# Patient Record
Sex: MALE | Race: WHITE | Employment: FULL TIME | URBAN - METROPOLITAN AREA
[De-identification: names, ages, dates, MRNs, and addresses within clinical notes are randomized per-mention and may not be internally consistent; named-entity substitution may affect disease eponyms.]

---

## 2018-02-06 ENCOUNTER — OFFICE VISIT (OUTPATIENT)
Dept: INTERNAL MEDICINE CLINIC | Age: 38
End: 2018-02-06

## 2018-02-06 VITALS
OXYGEN SATURATION: 100 % | DIASTOLIC BLOOD PRESSURE: 61 MMHG | RESPIRATION RATE: 18 BRPM | HEIGHT: 68 IN | BODY MASS INDEX: 26.52 KG/M2 | HEART RATE: 70 BPM | TEMPERATURE: 98.1 F | SYSTOLIC BLOOD PRESSURE: 116 MMHG | WEIGHT: 175 LBS

## 2018-02-06 DIAGNOSIS — R05.8 DRY COUGH: Primary | ICD-10-CM

## 2018-02-06 DIAGNOSIS — R09.82 POST-NASAL DRAINAGE: ICD-10-CM

## 2018-02-06 DIAGNOSIS — Z76.89 ESTABLISHING CARE WITH NEW DOCTOR, ENCOUNTER FOR: ICD-10-CM

## 2018-02-06 DIAGNOSIS — F32.A DEPRESSION, UNSPECIFIED DEPRESSION TYPE: ICD-10-CM

## 2018-02-06 RX ORDER — FLUOXETINE HYDROCHLORIDE 20 MG/1
CAPSULE ORAL
Refills: 0 | COMMUNITY
Start: 2018-01-20 | End: 2018-10-24 | Stop reason: ALTCHOICE

## 2018-02-06 NOTE — MR AVS SNAPSHOT
[facility] [address] [place] [address] 
[phone] Patient: Ozzy Franklin MRN: [id number]:[id number] Visit Information Date & Time Provider Department Dept. Phone Encounter #  
 2/6/2018  8:30 AM Bin Villagran MD [facility] Internal Medicine [phone] [id number] Follow-up Instructions Return in about 3 months (around 5/6/2018) for complete physical  and fasting blood work. Av Gomez Upcoming Health Maintenance Date Due DTaP/Tdap/Td series (1 - Tdap) 11/20/2003 Influenza Age 5 to Adult 8/1/2017 Allergies as of 2/6/2018  Review Complete On: 2/6/2018 By: Bin Villagran MD  
 No Known Allergies Current Immunizations  Never Reviewed Name Date Influenza Vaccine 9/16/2017 MMR 12/21/2017, 9/16/2017 Poliovirus vaccine 9/16/2017 Td 12/21/2017 Tdap 9/16/2017 Not reviewed this visit You Were Diagnosed With   
  
 Codes Comments Dry cough    -  Primary ICD-10-CM: X32 ICD-9-CM: 786.2 Establishing care with new doctor, encounter for     ICD-10-CM: Z76.89 
ICD-9-CM: V65.8 Vitals BP Pulse Temp Resp Height(growth percentile) 116/61 (BP 1 Location: Left arm, BP Patient Position: Sitting) 70 98.1 °F (36.7 °C) (Temporal) 18 5' 8\" (1.727 m) Weight(growth percentile) SpO2 BMI Smoking Status 175 lb (79.4 kg) 100% 26.61 kg/m2 Never Smoker Vitals History BMI and BSA Data Body Mass Index Body Surface Area  
 26.61 kg/m 2 1.95 m 2 Your Updated Medication List  
  
   
This list is accurate as of: 2/6/18  9:12 AM.  Always use your most recent med list.  
  
  
  
  
 FLUoxetine 20 mg capsule Commonly known as:  PROzac TAKE ONE CAPSULE BY MOUTH ONCE DAILY - SEE PCP AS INSTRUCTED Follow-up Instructions Return in about 3 months (around 5/6/2018) for complete physical  and fasting blood work. Av Gomez Patient Instructions Cough: Care Instructions Your Care Instructions A cough is your body's response to something that bothers your throat or airways. Many things can cause a cough. You might cough because of a cold or the flu, bronchitis, or asthma. Smoking, postnasal drip, allergies, and stomach acid that backs up into your throat also can cause coughs. A cough is a symptom, not a disease. Most coughs stop when the cause, such as a cold, goes away. You can take a few steps at home to cough less and feel better. Follow-up care is a key part of your treatment and safety. Be sure to make and go to all appointments, and call your doctor if you are having problems. It's also a good idea to know your test results and keep a list of the medicines you take. How can you care for yourself at home? · Drink lots of water and other fluids. This helps thin the mucus and soothes a dry or sore throat. Honey or lemon juice in hot water or tea may ease a dry cough. · Take cough medicine as directed by your doctor. · Prop up your head on pillows to help you breathe and ease a dry cough. · Try cough drops to soothe a dry or sore throat. Cough drops don't stop a cough. Medicine-flavored cough drops are no better than candy-flavored drops or hard candy. · Do not smoke. Avoid secondhand smoke. If you need help quitting, talk to your doctor about stop-smoking programs and medicines. These can increase your chances of quitting for good. When should you call for help? Call 911 anytime you think you may need emergency care. For example, call if: 
? · You have severe trouble breathing. ?Call your doctor now or seek immediate medical care if: 
? · You cough up blood. ? · You have new or worse trouble breathing. ? · You have a new or higher fever. ? · You have a new rash. ? Watch closely for changes in your health, and be sure to contact your doctor if: 
? · You cough more deeply or more often, especially if you notice more mucus or a change in the color of your mucus. ? · You have new symptoms, such as a sore throat, an earache, or sinus pain. ? · You do not get better as expected. Where can you learn more? Go to http://darrick-kecia.info/. Enter D279 in the search box to learn more about \"Cough: Care Instructions. \" Current as of: May 12, 2017 Content Version: 11.4 © 8959-6616 EchoFirst. Care instructions adapted under license by Link[employer] (which disclaims liability or warranty for this information). If you have questions about a medical condition or this instruction, always ask your healthcare professional. [address] any warranty or liability for your use of this information. Introducing [facility] & HEALTH SERVICES! [employer] introduces [employer] patient portal. Now you can access parts of your medical record, email your doctor's office, and request medication refills online. 1. In your internet browser, go to [email or web address] The Influence/iBiz Software 2. Click on the First Time User? Click Here link in the Sign In box. You will see the New Member Sign Up page. 3. Enter your [employer] Access Code exactly as it appears below. You will not need to use this code after youve completed the sign-up process. If you do not sign up before the expiration date, you must request a new code. · [employer] Access Code: [email or web address] Expires: 5/7/2018  8:58 AM 
 
4. Enter the last four digits of your Social Security Number (xxxx) and Date of Birth (mm/dd/yyyy) as indicated and click Submit. You will be taken to the next sign-up page. 5. Create a Medical Image Mining Laboratoriest ID. This will be your [employer] login ID and cannot be changed, so think of one that is secure and easy to remember. 6. Create a [employer] password. You can change your password at any time. 7. Enter your Password Reset Question and Answer. This can be used at a later time if you forget your password. 8. Enter your e-mail address. You will receive e-mail notification when new information is available in [address] 9. Click Sign Up. You can now view and download portions of your medical record. 10. Click the Download Summary menu link to download a portable copy of your medical information. If you have questions, please visit the Frequently Asked Questions section of the Viyet website. Remember, [employer] is NOT to be used for urgent needs. For medical emergencies, dial 911. Now available from your iPhone and Android! Please provide this summary of care documentation to your next provider. If you have any questions after today's visit, please call [phone].

## 2018-02-06 NOTE — PATIENT INSTRUCTIONS

## 2018-02-07 NOTE — PROGRESS NOTES
Subjective:     Chief Complaint   Patient presents with    Establish Care    Cough     dry cough 2-3 days        He  is a 30y.o. year old male recently moved from [place] who presents today as a new patient to establish. No reported significant medical history. Patient reports that pior to coming to [place] he was taking Prozac for depression. Reports that he is currently seeing a psychotherapist but not taking the med. Patient states that for the past two days he has been having slight dry cough in the morning. Having post nasal drip but no nasal stuffiness, sinus pain or headache. Denies any sore throat, fever, chest pain, soa. A comprehensive review of systems was negative except for that written in the HPI.   Objective:     Vitals:    02/06/18 0834   BP: 116/61   Pulse: 70   Resp: 18   Temp: 98.1 °F (36.7 °C)   TempSrc: Temporal   SpO2: 100%   Weight: 175 lb (79.4 kg)   Height: 5' 8\" (1.727 m)       Physical Examination: General appearance - alert, well appearing, and in no distress, oriented to person, place, and time and normal appearing weight  Mental status - alert, oriented to person, place, and time, normal mood, behavior, speech, dress, motor activity, and thought processes  Ears - bilateral TM's and external ear canals normal  Nose - normal and patent, no erythema, discharge or polyps  Mouth - mucous membranes moist, pharynx normal without lesions and tonsils hypertrophied with no exudate  Neck - supple, no significant adenopathy  Chest - clear to auscultation, no wheezes, rales or rhonchi, symmetric air entry  Heart - normal rate, regular rhythm, normal S1, S2, no murmurs, rubs, clicks or gallops  Neurological - alert, oriented, normal speech, no focal findings or movement disorder noted    No Known Allergies   Social History     Social History    Marital status:      Spouse name: N/A    Number of children: N/A    Years of education: N/A     Social History Main Topics    Smoking status: Never Smoker    Smokeless tobacco: Current User    Alcohol use No    Drug use: No    Sexual activity: Yes     Partners: Female     Other Topics Concern    None     Social History Narrative    None      Family History   Problem Relation Age of Onset    No Known Problems Mother     No Known Problems Father       History reviewed. No pertinent surgical history. History reviewed. No pertinent past medical history. Current Outpatient Prescriptions   Medication Sig Dispense Refill    FLUoxetine (PROZAC) 20 mg capsule TAKE ONE CAPSULE BY MOUTH ONCE DAILY - SEE PCP AS INSTRUCTED  0        Assessment/ Plan:   Diagnoses and all orders for this visit:    1. Dry cough     - mild cough. Advised OTC Mucinex-d. 2. Post-nasal drainage      -  Advised OTC Mucinex-d. 3. Establishing care with new doctor, encounter for    4. Depression, unspecified depression type     - he is not taking Prozac currently. -    Continue follow up with psychologist. Follow up with me if symptoms worsen. Medication risks/benefits/costs/interactions/alternatives discussed with patient. Advised patient to call back or return to office if symptoms worsen/change/persist. If patient cannot reach us or should anything more severe/urgent arise he/she should proceed directly to the nearest emergency department. Discussed expected course/resolution/complications of diagnosis in detail with patient. Patient given a written after visit summary which includes her diagnoses, current medications and vitals. Patient expressed understanding with the diagnosis and plan. Follow-up Disposition:  Return in about 3 months (around 5/6/2018) for complete physical  and fasting blood work. Jolanta Bernal

## 2018-03-21 ENCOUNTER — OFFICE VISIT (OUTPATIENT)
Dept: INTERNAL MEDICINE CLINIC | Age: 38
End: 2018-03-21

## 2018-03-21 VITALS
SYSTOLIC BLOOD PRESSURE: 108 MMHG | BODY MASS INDEX: 26.92 KG/M2 | WEIGHT: 177.6 LBS | DIASTOLIC BLOOD PRESSURE: 64 MMHG | TEMPERATURE: 97.5 F | HEART RATE: 72 BPM | RESPIRATION RATE: 18 BRPM | HEIGHT: 68 IN | OXYGEN SATURATION: 99 %

## 2018-03-21 DIAGNOSIS — Z00.00 WELL ADULT HEALTH CHECK: Primary | ICD-10-CM

## 2018-03-21 DIAGNOSIS — F32.A DEPRESSION, UNSPECIFIED DEPRESSION TYPE: ICD-10-CM

## 2018-03-21 NOTE — PROGRESS NOTES
Subjective:   Lillian Ramon is a 30 y.o. male presenting for his annual checkup. Doing well. No concerns. Patient reports that he has been feeling better. Denies any depression. He finished 3 sessions with psychotherapy. He states that he does not need any medication. ROS:  Feeling well. No dyspnea or chest pain on exertion. No abdominal pain, change in bowel habits, black or bloody stools. No urinary tract or prostatic symptoms. No neurological complaints. Specific concerns today: none    Patient Active Problem List   Diagnosis Code    Depression F32.9     Current Outpatient Prescriptions   Medication Sig Dispense Refill    FLUoxetine (PROZAC) 20 mg capsule TAKE ONE CAPSULE BY MOUTH ONCE DAILY - SEE PCP AS INSTRUCTED  0     No Known Allergies  History reviewed. No pertinent past medical history. History reviewed. No pertinent surgical history. Family History   Problem Relation Age of Onset    No Known Problems Mother     No Known Problems Father      Social History   Substance Use Topics    Smoking status: Never Smoker    Smokeless tobacco: Current User    Alcohol use No             Objective:     Visit Vitals    /64 (BP 1 Location: Left arm, BP Patient Position: Sitting)    Pulse 72    Temp 97.5 °F (36.4 °C) (Temporal)    Resp 18    Ht 5' 8\" (1.727 m)    Wt 177 lb 9.6 oz (80.6 kg)    SpO2 99%    BMI 27 kg/m2     The patient appears well, alert, oriented x 3, in no distress. ENT normal.  Neck supple. No adenopathy or thyromegaly. CHRISTIANO. Lungs are clear, good air entry, no wheezes, rhonchi or rales. S1 and S2 normal, no murmurs, regular rate and rhythm. Abdomen is soft without tenderness, guarding, mass or organomegaly.  exam: deferred. Extremities show no edema, normal peripheral pulses. Neurological is normal without focal findings. Assessment/Plan:   healthy adult male  follow low fat diet, continue present plan, routine labs ordered, call if any problems.     ICD-10-CM ICD-9-CM    1. Well adult health check Z00.00 V70.0 CBC WITH AUTOMATED DIFF      METABOLIC PANEL, COMPREHENSIVE      LIPID PANEL   2. Depression, unspecified depression type F32.9 311 TSH AND FREE T4     Diagnoses and all orders for this visit:    1. Well adult health check  -     CBC WITH AUTOMATED DIFF  -     METABOLIC PANEL, COMPREHENSIVE  -     LIPID PANEL    2. Depression, unspecified depression type  -     Patient reports that he has been feeling better. He finished 3 sessions with psychotherapy. He states that he does not need any medication.   -     TSH AND FREE T4    3. BMI 27.0-27.9,adult  Discussed the patient's BMI with him. The BMI follow up plan is as follows:     dietary management education, guidance, and counseling  encourage exercise  monitor weight  prescribed dietary intake        Follow-up Disposition:  Return if symptoms worsen or fail to improve. reviewed diet, exercise and weight control. An After Visit Summary was printed and given to the patient.

## 2018-03-21 NOTE — LETTER
3/23/2018 12:19 PM 
 
Mr. Dereck Diaz 
[address] 
[facility] [address] Dear Dereck Diaz: 
 
Please find your most recent results below. Resulted Orders CBC WITH AUTOMATED DIFF Result Value Ref Range WBC 4.5 3.4 - 10.8 x10E3/uL  
 RBC 4.57 4.14 - 5.80 x10E6/uL HGB 14.3 13.0 - 17.7 g/dL HCT 41.6 37.5 - 51.0 % MCV 91 79 - 97 fL  
 MCH 31.3 26.6 - 33.0 pg  
 MCHC 34.4 31.5 - 35.7 g/dL  
 RDW 13.0 12.3 - 15.4 % PLATELET 777 836 - 534 x10E3/uL NEUTROPHILS 47 Not Estab. % Lymphocytes 37 Not Estab. % MONOCYTES 12 Not Estab. % EOSINOPHILS 4 Not Estab. % BASOPHILS 0 Not Estab. %  
 ABS. NEUTROPHILS 2.1 1.4 - 7.0 x10E3/uL Abs Lymphocytes 1.7 0.7 - 3.1 x10E3/uL  
 ABS. MONOCYTES 0.5 0.1 - 0.9 x10E3/uL  
 ABS. EOSINOPHILS 0.2 0.0 - 0.4 x10E3/uL  
 ABS. BASOPHILS 0.0 0.0 - 0.2 x10E3/uL IMMATURE GRANULOCYTES 0 Not Estab. %  
 ABS. IMM. GRANS. 0.0 0.0 - 0.1 x10E3/uL Narrative Performed at:  [address]  [id number] : Jose Benitez MD, Phone:  [phone] METABOLIC PANEL, COMPREHENSIVE Result Value Ref Range Glucose 104 (H) 65 - 99 mg/dL BUN 11 6 - 20 mg/dL Creatinine 0.83 0.76 - 1.27 mg/dL GFR est non- >59 mL/min/1.73 GFR est  >59 mL/min/1.73  
 BUN/Creatinine ratio 13 9 - 20 Sodium 138 134 - 144 mmol/L Potassium 4.0 3.5 - 5.2 mmol/L Chloride 101 96 - 106 mmol/L  
 CO2 23 18 - 29 mmol/L Calcium 9.4 8.7 - 10.2 mg/dL Protein, total 6.9 6.0 - 8.5 g/dL Albumin 4.3 3.5 - 5.5 g/dL GLOBULIN, TOTAL 2.6 1.5 - 4.5 g/dL A-G Ratio 1.7 1.2 - 2.2 Bilirubin, total 1.4 (H) 0.0 - 1.2 mg/dL Alk. phosphatase 52 39 - 117 IU/L  
 AST (SGOT) 21 0 - 40 IU/L  
 ALT (SGPT) 20 0 - 44 IU/L Narrative Performed at:  [address]  [id number] : Jose Benitez MD, Phone:  [phone] LIPID PANEL  
 Result Value Ref Range Cholesterol, total 136 100 - 199 mg/dL Triglyceride 118 0 - 149 mg/dL HDL Cholesterol 39 (L) >39 mg/dL VLDL, calculated 24 5 - 40 mg/dL LDL, calculated 73 0 - 99 mg/dL Narrative Performed at:  [address]  [id number] : Justin Rosales MD, Phone:  [phone] TSH AND FREE T4 Result Value Ref Range TSH 3.990 0.450 - 4.500 uIU/mL T4, Free 1.29 0.82 - 1.77 ng/dL Narrative Performed at:  [address]  [id number] : Justin Rosales MD, Phone:  [phone] CVD REPORT Result Value Ref Range INTERPRETATION Note Comment:  
   Supplemental report is available. Narrative Performed at:  [address] [place] [address] [address] [place] [address]  [id number] : Billy Ramos PhD, Phone:  [phone] RECOMMENDATIONS: 
 
CBC, kidney, liver, thyroid, cholesterol level is normal 
 
Please call me if you have any questions: [phone] Sincerely, 
 
 
Lee Rangel MD

## 2018-03-21 NOTE — MR AVS SNAPSHOT
303 [facility] 
 
 
 Vanessa Drake Dewitt [address] 
[phone] Patient: Gina Rucker MRN: [id number]:[id number] Visit Information Date & Time Provider Department Dept. Phone Encounter #  
 3/21/2018  1:30 PM Geovany Quezada MD [facility] Internal Medicine [phone] [id number] Follow-up Instructions Return if symptoms worsen or fail to improve. Your Appointments 3/21/2018  1:30 PM  
Complete Physical with Geovany Quezada MD  
[facility] Internal Medicine [facility]) Appt Note: physical; snow [address]essa [address] Dewitt [address] [facility] [address]  
[phone]  
  
   
 [place] [address] Upcoming Health Maintenance Date Due DTaP/Tdap/Td series (2 - Td) 12/21/2027 Allergies as of 3/21/2018  Review Complete On: 3/21/2018 By: Geovany Quezada MD  
 No Known Allergies Current Immunizations  Reviewed on 3/21/2018 Name Date Influenza Vaccine 9/16/2017 MMR 12/21/2017, 9/16/2017 Poliovirus vaccine 9/16/2017 Td 12/21/2017 Tdap 9/16/2017 Reviewed by Geovany Quezada MD on 3/21/2018 at 11:53 AM  
You Were Diagnosed With   
  
 Codes Comments Well adult health check    -  Primary ICD-10-CM: Z00.00 ICD-9-CM: V70.0 Depression, unspecified depression type     ICD-10-CM: F32.9 ICD-9-CM: 754 Vitals BP Pulse Temp Resp Height(growth percentile) 108/64 (BP 1 Location: Left arm, BP Patient Position: Sitting) 72 97.5 °F (36.4 °C) (Temporal) 18 5' 8\" (1.727 m) Weight(growth percentile) SpO2 BMI Smoking Status 177 lb 9.6 oz (80.6 kg) 99% 27 kg/m2 Never Smoker Vitals History BMI and BSA Data Body Mass Index Body Surface Area  
 27 kg/m 2 1.97 m 2 Preferred Pharmacy Pharmacy Name Phone [id number], [address] [place] [address] [phone] Your Updated Medication List  
  
   
 This list is accurate as of 3/21/18 11:54 AM.  Always use your most recent med list.  
  
  
  
  
 FLUoxetine 20 mg capsule Commonly known as:  PROzac TAKE ONE CAPSULE BY MOUTH ONCE DAILY - SEE PCP AS INSTRUCTED We Performed the Following CBC WITH AUTOMATED DIFF [97147 CPT(R)] LIPID PANEL [90847 CPT(R)] METABOLIC PANEL, COMPREHENSIVE [80470 CPT(R)] TSH AND FREE T4 [32327 CPT(R)] Follow-up Instructions Return if symptoms worsen or fail to improve. Introducing [facility] & HEALTH SERVICES! Aram Hester introduces [employer] patient portal. Now you can access parts of your medical record, email your doctor's office, and request medication refills online. 1. In your internet browser, go to [email or web address] 2. Click on the First Time User? Click Here link in the Sign In box. You will see the New Member Sign Up page. 3. Enter your Paray[employer] Access Code exactly as it appears below. You will not need to use this code after youve completed the sign-up process. If you do not sign up before the expiration date, you must request a new code. · [employer] Access Code: [email or web address] Expires: 5/7/2018  9:58 AM 
 
4. Enter the last four digits of your Social Security Number (xxxx) and Date of Birth (mm/dd/yyyy) as indicated and click Submit. You will be taken to the next sign-up page. 5. Create a Paraytec ID. This will be your Paray[employer] login ID and cannot be changed, so think of one that is secure and easy to remember. 6. Create a Par[employer]tec password. You can change your password at any time. 7. Enter your Password Reset Question and Answer. This can be used at a later time if you forget your password. 8. Enter your e-mail address. You will receive e-mail notification when new information is available in [address] 9. Click Sign Up. You can now view and download portions of your medical record.  
10. Click the Download Summary menu link to download a portable copy of your medical information. If you have questions, please visit the Frequently Asked Questions section of the Picatcha website. Remember, [employer] is NOT to be used for urgent needs. For medical emergencies, dial 911. Now available from your iPhone and Android! Please provide this summary of care documentation to your next provider. If you have any questions after today's visit, please call [phone].

## 2018-03-21 NOTE — PATIENT INSTRUCTIONS
Body Mass Index: Care Instructions  Your Care Instructions    Body mass index (BMI) can help you see if your weight is raising your risk for health problems. It uses a formula to compare how much you weigh with how tall you are. · A BMI lower than 18.5 is considered underweight. · A BMI between 18.5 and 24.9 is considered healthy. · A BMI between 25 and 29.9 is considered overweight. A BMI of 30 or higher is considered obese. If your BMI is in the normal range, it means that you have a lower risk for weight-related health problems. If your BMI is in the overweight or obese range, you may be at increased risk for weight-related health problems, such as high blood pressure, heart disease, stroke, arthritis or joint pain, and diabetes. If your BMI is in the underweight range, you may be at increased risk for health problems such as fatigue, lower protection (immunity) against illness, muscle loss, bone loss, hair loss, and hormone problems. BMI is just one measure of your risk for weight-related health problems. You may be at higher risk for health problems if you are not active, you eat an unhealthy diet, or you drink too much alcohol or use tobacco products. Follow-up care is a key part of your treatment and safety. Be sure to make and go to all appointments, and call your doctor if you are having problems. It's also a good idea to know your test results and keep a list of the medicines you take. How can you care for yourself at home? · Practice healthy eating habits. This includes eating plenty of fruits, vegetables, whole grains, lean protein, and low-fat dairy. · If your doctor recommends it, get more exercise. Walking is a good choice. Bit by bit, increase the amount you walk every day. Try for at least 30 minutes on most days of the week. · Do not smoke. Smoking can increase your risk for health problems. If you need help quitting, talk to your doctor about stop-smoking programs and medicines. These can increase your chances of quitting for good. · Limit alcohol to 2 drinks a day for men and 1 drink a day for women. Too much alcohol can cause health problems. If you have a BMI higher than 25  · Your doctor may do other tests to check your risk for weight-related health problems. This may include measuring the distance around your waist. A waist measurement of more than 40 inches in men or 35 inches in women can increase the risk of weight-related health problems. · Talk with your doctor about steps you can take to stay healthy or improve your health. You may need to make lifestyle changes to lose weight and stay healthy, such as changing your diet and getting regular exercise. If you have a BMI lower than 18.5  · Your doctor may do other tests to check your risk for health problems. · Talk with your doctor about steps you can take to stay healthy or improve your health. You may need to make lifestyle changes to gain or maintain weight and stay healthy, such as getting more healthy foods in your diet and doing exercises to build muscle. Where can you learn more? Go to http://darrick-kecia.info/. Enter S176 in the search box to learn more about \"Body Mass Index: Care Instructions. \"  Current as of: October 13, 2016  Content Version: 11.4  © 8470-9011 Healthwise, [employer]orporated. Care instructions adapted under license by [employer] (which disclaims liability or warranty for this information). If you have questions about a medical condition or this instruction, always ask your healthcare professional. [address] any warranty or liability for your use of this information.

## 2018-03-22 ENCOUNTER — LAB ONLY (OUTPATIENT)
Dept: INTERNAL MEDICINE CLINIC | Age: 38
End: 2018-03-22

## 2018-03-22 NOTE — PROGRESS NOTES
Patient here for labs only. Informed pt that he will be notified of results.  Pt verbalized his understanding

## 2018-03-23 LAB
ALBUMIN SERPL-MCNC: 4.3 G/DL (ref 3.5–5.5)
ALBUMIN/GLOB SERPL: 1.7 {RATIO} (ref 1.2–2.2)
ALP SERPL-CCNC: 52 IU/L (ref 39–117)
ALT SERPL-CCNC: 20 IU/L (ref 0–44)
AST SERPL-CCNC: 21 IU/L (ref 0–40)
BASOPHILS # BLD AUTO: 0 X10E3/UL (ref 0–0.2)
BASOPHILS NFR BLD AUTO: 0 %
BILIRUB SERPL-MCNC: 1.4 MG/DL (ref 0–1.2)
BUN SERPL-MCNC: 11 MG/DL (ref 6–20)
BUN/CREAT SERPL: 13 (ref 9–20)
CALCIUM SERPL-MCNC: 9.4 MG/DL (ref 8.7–10.2)
CHLORIDE SERPL-SCNC: 101 MMOL/L (ref 96–106)
CHOLEST SERPL-MCNC: 136 MG/DL (ref 100–199)
CO2 SERPL-SCNC: 23 MMOL/L (ref 18–29)
CREAT SERPL-MCNC: 0.83 MG/DL (ref 0.76–1.27)
EOSINOPHIL # BLD AUTO: 0.2 X10E3/UL (ref 0–0.4)
EOSINOPHIL NFR BLD AUTO: 4 %
ERYTHROCYTE [DISTWIDTH] IN BLOOD BY AUTOMATED COUNT: 13 % (ref 12.3–15.4)
GFR SERPLBLD CREATININE-BSD FMLA CKD-EPI: 114 ML/MIN/1.73
GFR SERPLBLD CREATININE-BSD FMLA CKD-EPI: 132 ML/MIN/1.73
GLOBULIN SER CALC-MCNC: 2.6 G/DL (ref 1.5–4.5)
GLUCOSE SERPL-MCNC: 104 MG/DL (ref 65–99)
HCT VFR BLD AUTO: 41.6 % (ref 37.5–51)
HDLC SERPL-MCNC: 39 MG/DL
HGB BLD-MCNC: 14.3 G/DL (ref 13–17.7)
IMM GRANULOCYTES # BLD: 0 X10E3/UL (ref 0–0.1)
IMM GRANULOCYTES NFR BLD: 0 %
INTERPRETATION, 910389: NORMAL
LDLC SERPL CALC-MCNC: 73 MG/DL (ref 0–99)
LYMPHOCYTES # BLD AUTO: 1.7 X10E3/UL (ref 0.7–3.1)
LYMPHOCYTES NFR BLD AUTO: 37 %
MCH RBC QN AUTO: 31.3 PG (ref 26.6–33)
MCHC RBC AUTO-ENTMCNC: 34.4 G/DL (ref 31.5–35.7)
MCV RBC AUTO: 91 FL (ref 79–97)
MONOCYTES # BLD AUTO: 0.5 X10E3/UL (ref 0.1–0.9)
MONOCYTES NFR BLD AUTO: 12 %
NEUTROPHILS # BLD AUTO: 2.1 X10E3/UL (ref 1.4–7)
NEUTROPHILS NFR BLD AUTO: 47 %
PLATELET # BLD AUTO: 222 X10E3/UL (ref 150–379)
POTASSIUM SERPL-SCNC: 4 MMOL/L (ref 3.5–5.2)
PROT SERPL-MCNC: 6.9 G/DL (ref 6–8.5)
RBC # BLD AUTO: 4.57 X10E6/UL (ref 4.14–5.8)
SODIUM SERPL-SCNC: 138 MMOL/L (ref 134–144)
T4 FREE SERPL-MCNC: 1.29 NG/DL (ref 0.82–1.77)
TRIGL SERPL-MCNC: 118 MG/DL (ref 0–149)
TSH SERPL DL<=0.005 MIU/L-ACNC: 3.99 UIU/ML (ref 0.45–4.5)
VLDLC SERPL CALC-MCNC: 24 MG/DL (ref 5–40)
WBC # BLD AUTO: 4.5 X10E3/UL (ref 3.4–10.8)

## 2018-07-16 ENCOUNTER — OFFICE VISIT (OUTPATIENT)
Dept: INTERNAL MEDICINE CLINIC | Age: 38
End: 2018-07-16

## 2018-07-16 VITALS
DIASTOLIC BLOOD PRESSURE: 62 MMHG | OXYGEN SATURATION: 99 % | RESPIRATION RATE: 18 BRPM | SYSTOLIC BLOOD PRESSURE: 112 MMHG | BODY MASS INDEX: 26.98 KG/M2 | HEART RATE: 64 BPM | HEIGHT: 68 IN | WEIGHT: 178 LBS | TEMPERATURE: 98.1 F

## 2018-07-16 DIAGNOSIS — M54.50 ACUTE MIDLINE LOW BACK PAIN WITHOUT SCIATICA: Primary | ICD-10-CM

## 2018-07-16 RX ORDER — NAPROXEN 500 MG/1
500 TABLET ORAL 2 TIMES DAILY WITH MEALS
Qty: 30 TAB | Refills: 0 | Status: SHIPPED | OUTPATIENT
Start: 2018-07-16 | End: 2018-07-27 | Stop reason: SDUPTHER

## 2018-07-16 NOTE — MR AVS SNAPSHOT
Domingo Dewitt 26 [facility] 
[phone] Patient: Kilo Duncan MRN: [id number] Visit Information Date & Time Provider Department Dept. Phone Encounter #  
 7/16/2018  3:00 PM Bin Villagran MD 30 [facility] [phone] [id number] Upcoming Health Maintenance Date Due Influenza Age 5 to Adult 8/1/2018 DTaP/Tdap/Td series (2 - Td) 12/21/2027 Allergies as of 7/16/2018  Review Complete On: 7/16/2018 By: Benito Shaw LPN No Known Allergies Current Immunizations  Reviewed on 3/21/2018 Name Date Influenza Vaccine 9/16/2017 MMR 12/21/2017, 9/16/2017 Poliovirus vaccine 9/16/2017 Td 12/21/2017 Tdap 9/16/2017 Not reviewed this visit You Were Diagnosed With   
  
 Codes Comments Acute midline low back pain without sciatica    -  Primary ICD-10-CM: M54.5 ICD-9-CM: 724.2 Vitals BP Pulse Temp Resp Height(growth percentile) 112/62 (BP 1 Location: Left arm, BP Patient Position: Sitting) 64 98.1 °F (36.7 °C) (Temporal) 18 5' 8\" (1.727 m) Weight(growth percentile) SpO2 BMI Smoking Status 178 lb (80.7 kg) 99% 27.06 kg/m2 Never Smoker BMI and BSA Data Body Mass Index Body Surface Area  
 27.06 kg/m 2 1.97 m 2 Preferred Pharmacy Pharmacy Name Phone [facility]Y #[id number]- Kian Ziegler, [address] [place] [phone] Your Updated Medication List  
  
   
This list is accurate as of 7/16/18  3:39 PM.  Always use your most recent med list.  
  
  
  
  
 FLUoxetine 20 mg capsule Commonly known as:  PROzac TAKE ONE CAPSULE BY MOUTH ONCE DAILY - SEE PCP AS INSTRUCTED  
  
 naproxen 500 mg tablet Commonly known as:  NAPROSYN Take 1 Tab by mouth two (2) times daily (with meals). Prescriptions Sent to Pharmacy  Refills  
 naproxen (NAPROSYN) 500 mg tablet 0  
 Sig: Take 1 Tab by mouth two (2) times daily (with meals). Class: Normal  
 Pharmacy: [facility]/pharmacy #[id number] #: [phone] Route: Oral  
  
Patient Instructions Learning About How to Have a Healthy Back What causes back pain? Back pain is often caused by overuse, strain, or injury. For example, people often hurt their backs playing sports or working in the yard, being jolted in a car accident, or lifting something too heavy. Aging plays a part too. Your bones and muscles tend to lose strength as you age, which makes injury more likely. The spongy discs between the bones of the spine (vertebrae) may suffer from wear and tear and no longer provide enough cushion between the bones. A disc that bulges or breaks open (herniated disc) can press on nerves, causing back pain. In some people, back pain is the result of arthritis, broken vertebrae caused by bone loss (osteoporosis), illness, or a spine problem. Although most people have back pain at one time or another, there are steps you can take to make it less likely. How can you have a healthy back? Reduce stress on your back through good posture Slumping or slouching alone may not cause low back pain. But after the back has been strained or injured, bad posture can make pain worse. · Sleep in a position that maintains your back's normal curves and on a mattress that feels comfortable. Sleep on your side with a pillow between your knees, or sleep on your back with a pillow under your knees. These positions can reduce strain on your back. · Stand and sit up straight. \"Good posture\" generally means your ears, shoulders, and hips are in a straight line. · If you must stand for a long time, put one foot on a stool, ledge, or box. Switch feet every now and then.  
· Sit in a chair that is low enough to let you place both feet flat on the floor with both knees nearly level with your hips. If your chair or desk is too high, use a footrest to raise your knees. Place a small pillow, a rolled-up towel, or a lumbar roll in the curve of your back if you need extra support. · Try a kneeling chair, which helps tilt your hips forward. This takes pressure off your lower back. · Try sitting on an exercise ball. It can rock from side to side, which helps keep your back loose. · When driving, keep your knees nearly level with your hips. Sit straight, and drive with both hands on the steering wheel. Your arms should be in a slightly bent position. Reduce stress on your back through careful lifting · Squat down, bending at the hips and knees only. If you need to, put one knee to the floor and extend your other knee in front of you, bent at a right angle (half kneeling). · Press your chest straight forward. This helps keep your upper back straight while keeping a slight arch in your low back. · Hold the load as close to your body as possible, at the level of your belly button (navel). · Use your feet to change direction, taking small steps. · Lead with your hips as you change direction. Keep your shoulders in line with your hips as you move. · Set down your load carefully, squatting with your knees and hips only. Exercise and stretch your back · Do some exercise on most days of the week, if your doctor says it is okay. You can walk, run, swim, or cycle. · Stretch your back muscles. Here are a few exercises to try: ¨ Lie on your back, and gently pull one bent knee to your chest. Put that foot back on the floor, and then pull the other knee to your chest. 
¨ Do pelvic tilts. Lie on your back with your knees bent. Tighten your stomach muscles. Pull your belly button (navel) in and up toward your ribs. You should feel like your back is pressing to the floor and your hips and pelvis are slightly lifting off the floor.  Hold for 6 seconds while breathing smoothly. ¨ Sit with your back flat against a wall. · Keep your core muscles strong. The muscles of your back, belly (abdomen), and buttocks support your spine. ¨ Pull in your belly and imagine pulling your navel toward your spine. Hold this for 6 seconds, then relax. Remember to keep breathing normally as you tense your muscles. ¨ Do curl-ups. Always do them with your knees bent. Keep your low back on the floor, and curl your shoulders toward your knees using a smooth, slow motion. Keep your arms folded across your chest. If this bothers your neck, try putting your hands behind your neck (not your head), with your elbows spread apart. ¨ Lie on your back with your knees bent and your feet flat on the floor. Tighten your belly muscles, and then push with your feet and raise your buttocks up a few inches. Hold this position 6 seconds as you continue to breathe normally, then lower yourself slowly to the floor. Repeat 8 to 12 times. ¨ If you like group exercise, try Pilates or yoga. These classes have poses that strengthen the core muscles. Lead a healthy lifestyle · Stay at a healthy weight to avoid strain on your back. · Do not smoke. Smoking increases the risk of osteoporosis, which weakens the spine. If you need help quitting, talk to your doctor about stop-smoking programs and medicines. These can increase your chances of quitting for good. Where can you learn more? Go to http://darrick-kecia.info/. Enter L315 in the search box to learn more about \"Learning About How to Have a Healthy Back. \" Current as of: November 29, 2017 Content Version: 11.7 © 3618-2902 [employer]wise, Incorporated. Care instructions adapted under license by [employer] (which disclaims liability or warranty for this information).  If you have questions about a medical condition or this instruction, always ask your healthcare professional. Christian[employer] Perez[employer] disclaims any warranty or liability for your use of this information. Introducing [facility]! [facility] introduces [employer] patient portal. Now you can access parts of your medical record, email your doctor's office, and request medication refills online. 1. In your internet browser, go to [email or web address] 2. Click on the First Time User? Click Here link in the Sign In box. You will see the New Member Sign Up page. 3. Enter your [employer] Access Code exactly as it appears below. You will not need to use this code after youve completed the sign-up process. If you do not sign up before the expiration date, you must request a new code. · [employer] Access Code: [email or web address] Expires: 10/14/2018  3:12 PM 
 
4. Enter the last four digits of your Social Security Number (xxxx) and Date of Birth (mm/dd/yyyy) as indicated and click Submit. You will be taken to the next sign-up page. 5. Create a [employer] ID. This will be your [employer] login ID and cannot be changed, so think of one that is secure and easy to remember. 6. Create a [employer] password. You can change your password at any time. 7. Enter your Password Reset Question and Answer. This can be used at a later time if you forget your password. 8. Enter your e-mail address. You will receive e-mail notification when new information is available in [address] 9. Click Sign Up. You can now view and download portions of your medical record. 10. Click the Download Summary menu link to download a portable copy of your medical information. If you have questions, please visit the Frequently Asked Questions section of the [employer] website. Remember, [employer] is NOT to be used for urgent needs. For medical emergencies, dial 911. Now available from your iPhone and Android! Please provide this summary of care documentation to your next provider. If you have any questions after today's visit, please call [phone].

## 2018-07-16 NOTE — PATIENT INSTRUCTIONS
Learning About How to Have a Healthy Back What causes back pain? Back pain is often caused by overuse, strain, or injury. For example, people often hurt their backs playing sports or working in the yard, being jolted in a car accident, or lifting something too heavy. Aging plays a part too. Your bones and muscles tend to lose strength as you age, which makes injury more likely. The spongy discs between the bones of the spine (vertebrae) may suffer from wear and tear and no longer provide enough cushion between the bones. A disc that bulges or breaks open (herniated disc) can press on nerves, causing back pain. In some people, back pain is the result of arthritis, broken vertebrae caused by bone loss (osteoporosis), illness, or a spine problem. Although most people have back pain at one time or another, there are steps you can take to make it less likely. How can you have a healthy back? Reduce stress on your back through good posture Slumping or slouching alone may not cause low back pain. But after the back has been strained or injured, bad posture can make pain worse. · Sleep in a position that maintains your back's normal curves and on a mattress that feels comfortable. Sleep on your side with a pillow between your knees, or sleep on your back with a pillow under your knees. These positions can reduce strain on your back. · Stand and sit up straight. \"Good posture\" generally means your ears, shoulders, and hips are in a straight line. · If you must stand for a long time, put one foot on a stool, ledge, or box. Switch feet every now and then. · Sit in a chair that is low enough to let you place both feet flat on the floor with both knees nearly level with your hips. If your chair or desk is too high, use a footrest to raise your knees. Place a small pillow, a rolled-up towel, or a lumbar roll in the curve of your back if you need extra support.  
· Try a kneeling chair, which helps tilt your hips forward. This takes pressure off your lower back. · Try sitting on an exercise ball. It can rock from side to side, which helps keep your back loose. · When driving, keep your knees nearly level with your hips. Sit straight, and drive with both hands on the steering wheel. Your arms should be in a slightly bent position. Reduce stress on your back through careful lifting · Squat down, bending at the hips and knees only. If you need to, put one knee to the floor and extend your other knee in front of you, bent at a right angle (half kneeling). · Press your chest straight forward. This helps keep your upper back straight while keeping a slight arch in your low back. · Hold the load as close to your body as possible, at the level of your belly button (navel). · Use your feet to change direction, taking small steps. · Lead with your hips as you change direction. Keep your shoulders in line with your hips as you move. · Set down your load carefully, squatting with your knees and hips only. Exercise and stretch your back · Do some exercise on most days of the week, if your doctor says it is okay. You can walk, run, swim, or cycle. · Stretch your back muscles. Here are a few exercises to try: ¨ Lie on your back, and gently pull one bent knee to your chest. Put that foot back on the floor, and then pull the other knee to your chest. 
¨ Do pelvic tilts. Lie on your back with your knees bent. Tighten your stomach muscles. Pull your belly button (navel) in and up toward your ribs. You should feel like your back is pressing to the floor and your hips and pelvis are slightly lifting off the floor. Hold for 6 seconds while breathing smoothly. ¨ Sit with your back flat against a wall. · Keep your core muscles strong. The muscles of your back, belly (abdomen), and buttocks support your spine. ¨ Pull in your belly and imagine pulling your navel toward your spine. Hold this for 6 seconds, then relax.  Remember to keep breathing normally as you tense your muscles. ¨ Do curl-ups. Always do them with your knees bent. Keep your low back on the floor, and curl your shoulders toward your knees using a smooth, slow motion. Keep your arms folded across your chest. If this bothers your neck, try putting your hands behind your neck (not your head), with your elbows spread apart. ¨ Lie on your back with your knees bent and your feet flat on the floor. Tighten your belly muscles, and then push with your feet and raise your buttocks up a few inches. Hold this position 6 seconds as you continue to breathe normally, then lower yourself slowly to the floor. Repeat 8 to 12 times. ¨ If you like group exercise, try Pilates or yoga. These classes have poses that strengthen the core muscles. Lead a healthy lifestyle · Stay at a healthy weight to avoid strain on your back. · Do not smoke. Smoking increases the risk of osteoporosis, which weakens the spine. If you need help quitting, talk to your doctor about stop-smoking programs and medicines. These can increase your chances of quitting for good. Where can you learn more? Go to http://darrick-kecia.info/. Enter L315 in the search box to learn more about \"Learning About How to Have a Healthy Back. \" Current as of: November 29, 2017 Content Version: 11.7 © 2522-6600 [employer]. Care instructions adapted under license by [employer] (which disclaims liability or warranty for this information). If you have questions about a medical condition or this instruction, always ask your healthcare professional. [place] [address] any warranty or liability for your use of this information.

## 2018-07-17 NOTE — PROGRESS NOTES
Subjective:     Chief Complaint   Patient presents with    Back Pain     mid back pain x almost two months. He  is a 30y.o. year old male with h/o depression present with mild pain in his lower back for the past two months. He reports that the pain is not too much. Laying on a soft surface usually trigers the pain. Laying in hard floor relieves the pain. Pain does not radiates. Reports that his previous job required lot of physical involvement but approximately two months ago he started orking as [employer] . Other than his new job there is no triggering factor noted. Did not take any OTC pain med. Pertinent items are noted in HPI. Objective:     Vitals:    07/16/18 1511   BP: 112/62   Pulse: 64   Resp: 18   Temp: 98.1 °F (36.7 °C)   TempSrc: Temporal   SpO2: 99%   Weight: 178 lb (80.7 kg)   Height: 5' 8\" (1.727 m)       Physical Examination: General appearance - alert, well appearing, and in no distress, oriented to person, place, and time and normal appearing weight  Mental status - alert, oriented to person, place, and time, normal mood, behavior, speech, dress, motor activity, and thought processes  Back exam - full range of motion, no tenderness, palpable spasm or pain on motion. straight leg test is normal. Sciatic area is non tender. Neurological - alert, oriented, normal speech, no focal findings or movement disorder noted    No Known Allergies   Social History     Social History    Marital status:      Spouse name: N/A    Number of children: N/A    Years of education: N/A     Social History Main Topics    Smoking status: Never Smoker    Smokeless tobacco: Current User    Alcohol use No    Drug use: No    Sexual activity: Yes     Partners: Female     Other Topics Concern    None     Social History Narrative      Family History   Problem Relation Age of Onset    No Known Problems Mother     No Known Problems Father       History reviewed. No pertinent surgical history. History reviewed. No pertinent past medical history. Current Outpatient Prescriptions   Medication Sig Dispense Refill    naproxen (NAPROSYN) 500 mg tablet Take 1 Tab by mouth two (2) times daily (with meals). 30 Tab 0    FLUoxetine (PROZAC) 20 mg capsule TAKE ONE CAPSULE BY MOUTH ONCE DAILY - SEE PCP AS INSTRUCTED  0        Assessment/ Plan:   Diagnoses and all orders for this visit:    1. Acute midline low back pain without sciatica  -     naproxen (NAPROSYN) 500 mg tablet; Take 1 Tab by mouth two (2) times daily (with meals). - heat and cold alternative therapy. - stretch exercise. Will consider Xray if symptoms not better. Medication risks/benefits/costs/interactions/alternatives discussed with patient. Advised patient to call back or return to office if symptoms worsen/change/persist. If patient cannot reach us or should anything more severe/urgent arise he/she should proceed directly to the nearest emergency department. Discussed expected course/resolution/complications of diagnosis in detail with patient. Patient given a written after visit summary which includes her diagnoses, current medications and vitals. Patient expressed understanding with the diagnosis and plan.        Follow-up Disposition: Not on File

## 2018-07-27 DIAGNOSIS — M54.50 ACUTE MIDLINE LOW BACK PAIN WITHOUT SCIATICA: ICD-10-CM

## 2018-07-27 RX ORDER — NAPROXEN 500 MG/1
TABLET ORAL
Qty: 30 TAB | Refills: 0 | Status: SHIPPED | OUTPATIENT
Start: 2018-07-27 | End: 2018-08-07 | Stop reason: SDUPTHER

## 2018-08-07 DIAGNOSIS — M54.50 ACUTE MIDLINE LOW BACK PAIN WITHOUT SCIATICA: ICD-10-CM

## 2018-08-07 RX ORDER — NAPROXEN 500 MG/1
TABLET ORAL
Qty: 30 TAB | Refills: 0 | Status: SHIPPED | OUTPATIENT
Start: 2018-08-07 | End: 2019-05-20 | Stop reason: ALTCHOICE

## 2018-09-26 ENCOUNTER — CLINICAL SUPPORT (OUTPATIENT)
Dept: PRIMARY CARE CLINIC | Age: 38
End: 2018-09-26

## 2018-09-26 VITALS
TEMPERATURE: 98.7 F | SYSTOLIC BLOOD PRESSURE: 101 MMHG | RESPIRATION RATE: 18 BRPM | DIASTOLIC BLOOD PRESSURE: 64 MMHG | BODY MASS INDEX: 26.98 KG/M2 | WEIGHT: 178 LBS | HEART RATE: 50 BPM | HEIGHT: 68 IN | OXYGEN SATURATION: 99 %

## 2018-09-26 DIAGNOSIS — Z23 ENCOUNTER FOR IMMUNIZATION: Primary | ICD-10-CM

## 2018-09-26 NOTE — PATIENT INSTRUCTIONS
Influenza (Flu) Vaccine: Care Instructions  Your Care Instructions    Influenza (flu) is an infection in the lungs and breathing passages. It is caused by the influenza virus. There are different strains, or types, of the flu virus every year. The flu comes on quickly. It can cause a cough, stuffy nose, fever, chills, tiredness, and aches and pains. These symptoms may last up to 10 days. The flu can make you feel very sick, but most of the time it doesn't lead to other problems. But it can cause serious problems in people who are older or who have a long-term illness, such as heart disease or diabetes. You can help prevent the flu by getting a flu vaccine every year, as soon as it is available. You cannot get the flu from the vaccine. The vaccine prevents most cases of the flu. But even when the vaccine doesn't prevent the flu, it can make symptoms less severe and reduce the chance of problems from the flu. Sometimes, young children and people who have an immune system problem may have a slight fever or muscle aches or pains 6 to 12 hours after getting the shot. These symptoms usually last 1 or 2 days. Follow-up care is a key part of your treatment and safety. Be sure to make and go to all appointments, and call your doctor if you are having problems. It's also a good idea to know your test results and keep a list of the medicines you take. Who should get the flu vaccine? Everyone age 7 months or older should get a flu vaccine each year. It lowers the chance of getting and spreading the flu. The vaccine is very important for people who are at high risk for getting other health problems from the flu. This includes:  · Anyone 48years of age or older. · People who live in a long-term care center, such as a nursing home. · All children 6 months through 25years of age. · Adults and children 6 months and older who have long-term heart or lung problems, such as asthma.   · Adults and children 6 months and older who needed medical care or were in a hospital during the past year because of diabetes, chronic kidney disease, or a weak immune system (including HIV or AIDS). · Women who will be pregnant during the flu season. · People who have any condition that can make it hard to breathe or swallow (such as a brain injury or muscle disorders). · People who can give the flu to others who are at high risk for problems from the flu. This includes all health care workers and close contacts of people age 72 or older. Who should not get the flu vaccine? The person who gives the vaccine may tell you not to get it if you:  · Have a severe allergy to eggs or any part of the vaccine. · Have had a severe reaction to a flu vaccine in the past.  · Have had Guillain-Barré syndrome (GBS). · Are sick with a fever. (Get the vaccine when symptoms are gone.)  How can you care for yourself at home? · If you or your child has a sore arm or a slight fever after the shot, take an over-the-counter pain medicine, such as acetaminophen (Tylenol) or ibuprofen (Advil, Motrin). Read and follow all instructions on the label. Do not give aspirin to anyone younger than 20. It has been linked to Reye syndrome, a serious illness. · Do not take two or more pain medicines at the same time unless the doctor told you to. Many pain medicines have acetaminophen, which is Tylenol. Too much acetaminophen (Tylenol) can be harmful. When should you call for help? Call 911 anytime you think you may need emergency care. For example, call if after getting the flu vaccine:    · You have symptoms of a severe reaction to the flu vaccine. Symptoms of a severe reaction may include:  ¨ Severe difficulty breathing. ¨ Sudden raised, red areas (hives) all over your body. ¨ Severe lightheadedness.    Call your doctor now or seek immediate medical care if after getting the flu vaccine:    · You think you are having a reaction to the flu vaccine, such as a new fever.  Watch closely for changes in your health, and be sure to contact your doctor if you have any problems. Where can you learn more? Go to http://darrick-kecia.info/. Enter G838 in the search box to learn more about \"Influenza (Flu) Vaccine: Care Instructions. \"  Current as of: October 6, 2017  Content Version: 11.7  © 0612-1614 [employer]. Care instructions adapted under license by [employer] (which disclaims liability or warranty for this information). If you have questions about a medical condition or this instruction, always ask your healthcare professional. [address] any warranty or liability for your use of this information.

## 2018-09-26 NOTE — PROGRESS NOTES
Chief Complaint   Patient presents with    Immunization/Injection     flu shot     Flu shot administered 9/26/2018 by Zenobia Holter, LPN with patient's consent. Patient tolerated procedure well. No reactions noted.

## 2018-10-12 ENCOUNTER — TELEPHONE (OUTPATIENT)
Dept: PRIMARY CARE CLINIC | Age: 38
End: 2018-10-12

## 2018-10-12 NOTE — TELEPHONE ENCOUNTER
Pt walked in stating he needs a record or form saying hes had his flu shot for the year.  He is waiting in the waiting room

## 2018-10-24 ENCOUNTER — OFFICE VISIT (OUTPATIENT)
Dept: PRIMARY CARE CLINIC | Age: 38
End: 2018-10-24

## 2018-10-24 VITALS
BODY MASS INDEX: 27.28 KG/M2 | HEIGHT: 68 IN | SYSTOLIC BLOOD PRESSURE: 113 MMHG | OXYGEN SATURATION: 99 % | WEIGHT: 180 LBS | RESPIRATION RATE: 16 BRPM | DIASTOLIC BLOOD PRESSURE: 67 MMHG | TEMPERATURE: 98 F | HEART RATE: 58 BPM

## 2018-10-24 DIAGNOSIS — Z00.00 WELL ADULT ON ROUTINE HEALTH CHECK: Primary | ICD-10-CM

## 2018-10-24 NOTE — PROGRESS NOTES
Chief Complaint   Patient presents with    Complete Physical     states that he does not have any concerns and is here for routine check up

## 2018-10-24 NOTE — PATIENT INSTRUCTIONS
Well Visit, Ages [address] [facility] 9 to 48: Care Instructions  Your Care Instructions    Physical exams can help you stay healthy. Your doctor has checked your overall health and may have suggested ways to take good care of yourself. He or she also may have recommended tests. At home, you can help prevent illness with healthy eating, regular exercise, and other steps. Follow-up care is a key part of your treatment and safety. Be sure to make and go to all appointments, and call your doctor if you are having problems. It's also a good idea to know your test results and keep a list of the medicines you take. How can you care for yourself at home? · Reach and stay at a healthy weight. This will lower your risk for many problems, such as obesity, diabetes, heart disease, and high blood pressure. · Get at least 30 minutes of physical activity on most days of the week. Walking is a good choice. You also may want to do other activities, such as running, swimming, cycling, or playing tennis or team sports. Discuss any changes in your exercise program with your doctor. · Do not smoke or allow others to smoke around you. If you need help quitting, talk to your doctor about stop-smoking programs and medicines. These can increase your chances of quitting for good. · Talk to your doctor about whether you have any risk factors for sexually transmitted infections (STIs). Having one sex partner (who does not have STIs and does not have sex with anyone else) is a good way to avoid these infections. · Use birth control if you do not want to have children at this time. Talk with your doctor about the choices available and what might be best for you. · Protect your skin from too much sun. When you're outdoors from 10 a.m. to 4 p.m., stay in the shade or cover up with clothing and a hat with a wide brim. Wear sunglasses that block UV rays. Even when it's cloudy, put broad-spectrum sunscreen (SPF 30 or higher) on any exposed skin.   · See a dentist one or two times a year for checkups and to have your teeth cleaned. · Wear a seat belt in the car. · Drink alcohol in moderation, if at all. That means no more than 2 drinks a day for men and 1 drink a day for women. Follow your doctor's advice about when to have certain tests. These tests can spot problems early. For everyone  · Cholesterol. Have the fat (cholesterol) in your blood tested after age 21. Your doctor will tell you how often to have this done based on your age, family history, or other things that can increase your risk for heart disease. · Blood pressure. Have your blood pressure checked during a routine doctor visit. Your doctor will tell you how often to check your blood pressure based on your age, your blood pressure results, and other factors. · Vision. Talk with your doctor about how often to have a glaucoma test.  · Diabetes. Ask your doctor whether you should have tests for diabetes. · Colon cancer. Have a test for colon cancer at age 48. You may have one of several tests. If you are younger than 48, you may need a test earlier if you have any risk factors. Risk factors include whether you already had a precancerous polyp removed from your colon or whether your parent, brother, sister, or child has had colon cancer. For women  · Breast exam and mammogram. Talk to your doctor about when you should have a clinical breast exam and a mammogram. Medical experts differ on whether and how often women under 50 should have these tests. Your doctor can help you decide what is right for you. · Pap test and pelvic exam. Begin Pap tests at age 24. A Pap test is the best way to find cervical cancer. The test often is part of a pelvic exam. Ask how often to have this test.  · Tests for sexually transmitted infections (STIs). Ask whether you should have tests for STIs. You may be at risk if you have sex with more than one person, especially if your partners do not wear condoms.   For men  · Tests for sexually transmitted infections (STIs). Ask whether you should have tests for STIs. You may be at risk if you have sex with more than one person, especially if you do not wear a condom. · Testicular cancer exam. Ask your doctor whether you should check your testicles regularly. · Prostate exam. Talk to your doctor about whether you should have a blood test (called a PSA test) for prostate cancer. Experts differ on whether and when men should have this test. Some experts suggest it if you are older than 39 and are -American or have a father or brother who got prostate cancer when he was younger than 72. When should you call for help? Watch closely for changes in your health, and be sure to contact your doctor if you have any problems or symptoms that concern you. Where can you learn more? Go to http://darrick-kecia.info/. Enter P072 in the search box to learn more about \"Well Visit, Ages 25 to 48: Care Instructions. \"  Current as of: March 29, 2018  Content Version: 11.8  © 5208-5881 [employer]. Care instructions adapted under license by [employer] (which disclaims liability or warranty for this information). If you have questions about a medical condition or this instruction, always ask your healthcare professional. [address] any warranty or liability for your use of this information. Body Mass Index: Care Instructions  Your Care Instructions    Body mass index (BMI) can help you see if your weight is raising your risk for health problems. It uses a formula to compare how much you weigh with how tall you are. · A BMI lower than 18.5 is considered underweight. · A BMI between 18.5 and 24.9 is considered healthy. · A BMI between 25 and 29.9 is considered overweight. A BMI of 30 or higher is considered obese.   If your BMI is in the normal range, it means that you have a lower risk for weight-related health problems. If your BMI is in the overweight or obese range, you may be at increased risk for weight-related health problems, such as high blood pressure, heart disease, stroke, arthritis or joint pain, and diabetes. If your BMI is in the underweight range, you may be at increased risk for health problems such as fatigue, lower protection (immunity) against illness, muscle loss, bone loss, hair loss, and hormone problems. BMI is just one measure of your risk for weight-related health problems. You may be at higher risk for health problems if you are not active, you eat an unhealthy diet, or you drink too much alcohol or use tobacco products. Follow-up care is a key part of your treatment and safety. Be sure to make and go to all appointments, and call your doctor if you are having problems. It's also a good idea to know your test results and keep a list of the medicines you take. How can you care for yourself at home? · Practice healthy eating habits. This includes eating plenty of fruits, vegetables, whole grains, lean protein, and low-fat dairy. · If your doctor recommends it, get more exercise. Walking is a good choice. Bit by bit, increase the amount you walk every day. Try for at least 30 minutes on most days of the week. · Do not smoke. Smoking can increase your risk for health problems. If you need help quitting, talk to your doctor about stop-smoking programs and medicines. These can increase your chances of quitting for good. · Limit alcohol to 2 drinks a day for men and 1 drink a day for women. Too much alcohol can cause health problems. If you have a BMI higher than 25  · Your doctor may do other tests to check your risk for weight-related health problems. This may include measuring the distance around your waist. A waist measurement of more than 40 inches in men or 35 inches in women can increase the risk of weight-related health problems.   · Talk with your doctor about steps you can take to stay healthy or improve your health. You may need to make lifestyle changes to lose weight and stay healthy, such as changing your diet and getting regular exercise. If you have a BMI lower than 18.5  · Your doctor may do other tests to check your risk for health problems. · Talk with your doctor about steps you can take to stay healthy or improve your health. You may need to make lifestyle changes to gain or maintain weight and stay healthy, such as getting more healthy foods in your diet and doing exercises to build muscle. Where can you learn more? Go to http://darrick-kecia.info/. Enter S176 in the search box to learn more about \"Body Mass Index: Care Instructions. \"  Current as of: October 13, 2016  Content Version: 11.4  © 7019-8248 [employer], [employer]. Care instructions adapted under license by [employer] (which disclaims liability or warranty for this information). If you have questions about a medical condition or this instruction, always ask your healthcare professional. [address] any warranty or liability for your use of this information.

## 2018-10-24 NOTE — PROGRESS NOTES
Subjective:   Pearl Pinto is a 30 y.o. male presenting for his annual checkup. Doing well. No concerns today. Exercises three times/week  States that depression has resolved. He is not taking Fluoxetine anymore. ROS:  Feeling well. No dyspnea or chest pain on exertion. No abdominal pain, change in bowel habits, black or bloody stools. No urinary tract or prostatic symptoms. No neurological complaints. Specific concerns today: none. Patient Active Problem List   Diagnosis Code    Depression F32.9     Current Outpatient Medications   Medication Sig Dispense Refill    naproxen (NAPROSYN) 500 mg tablet TAKE 1 TABLET BY MOUTH TWICE A DAY WITH FOOD 30 Tab 0    FLUoxetine (PROZAC) 20 mg capsule TAKE ONE CAPSULE BY MOUTH ONCE DAILY - SEE PCP AS INSTRUCTED  0     No Known Allergies  History reviewed. No pertinent past medical history. History reviewed. No pertinent surgical history. Family History   Problem Relation Age of Onset    No Known Problems Mother     No Known Problems Father      Social History     Tobacco Use    Smoking status: Never Smoker    Smokeless tobacco: Current User   Substance Use Topics    Alcohol use: No        Lab Results   Component Value Date/Time    WBC 4.5 03/22/2018 08:07 AM    HGB 14.3 03/22/2018 08:07 AM    HCT 41.6 03/22/2018 08:07 AM    PLATELET 982 38/94/6021 08:07 AM    MCV 91 03/22/2018 08:07 AM     Lab Results   Component Value Date/Time    Cholesterol, total 136 03/22/2018 08:07 AM    HDL Cholesterol 39 (L) 03/22/2018 08:07 AM    LDL, calculated 73 03/22/2018 08:07 AM    Triglyceride 118 03/22/2018 08:07 AM     Lab Results   Component Value Date/Time    ALT (SGPT) 20 03/22/2018 08:07 AM    AST (SGOT) 21 03/22/2018 08:07 AM    Alk.  phosphatase 52 03/22/2018 08:07 AM    Bilirubin, total 1.4 (H) 03/22/2018 08:07 AM    Albumin 4.3 03/22/2018 08:07 AM    Protein, total 6.9 03/22/2018 08:07 AM    PLATELET 341 49/91/4973 08:07 AM     Lab Results   Component Value Date/Time    GFR est non- 03/22/2018 08:07 AM    GFR est  03/22/2018 08:07 AM    Creatinine 0.83 03/22/2018 08:07 AM    BUN 11 03/22/2018 08:07 AM    Sodium 138 03/22/2018 08:07 AM    Potassium 4.0 03/22/2018 08:07 AM    Chloride 101 03/22/2018 08:07 AM    CO2 23 03/22/2018 08:07 AM        Objective:     Visit Vitals  /67 (BP 1 Location: Left arm, BP Patient Position: Sitting)   Pulse (!) 58   Temp 98 °F (36.7 °C) (Oral)   Resp 16   Ht 5' 8\" (1.727 m)   Wt 182 lb 9.6 oz (82.8 kg)   SpO2 99%   BMI 27.76 kg/m²     The patient appears well, alert, oriented x 3, in no distress. ENT normal.  Neck supple. No adenopathy or thyromegaly. CHRISTIANO. Lungs are clear, good air entry, no wheezes, rhonchi or rales. S1 and S2 normal, no murmurs, regular rate and rhythm. Abdomen is soft without tenderness, guarding, mass or organomegaly.  exam: deferred. .  Extremities show no edema, normal peripheral pulses. Neurological is normal without focal findings. Assessment/Plan:   healthy adult male  increase physical activity, continue present diet with no restrictions, follow low fat diet, follow low salt diet, continue present plan, routine labs ordered. ICD-10-CM ICD-9-CM    1. Well adult on routine health check Z00.00 V70.0 CBC WITH AUTOMATED DIFF      METABOLIC PANEL, COMPREHENSIVE      HEMOGLOBIN A1C WITH EAG      LIPID PANEL     Diagnoses and all orders for this visit:    1. Well adult on routine health check  -     CBC WITH AUTOMATED DIFF  -     METABOLIC PANEL, COMPREHENSIVE  -     HEMOGLOBIN A1C WITH EAG  -     LIPID PANEL      Follow-up Disposition:  Return if symptoms worsen or fail to improve. reviewed diet, exercise and weight control. Discussed the patient's BMI with him. The BMI follow up plan is as follows:     dietary management education, guidance, and counseling  encourage exercise  monitor weight      An After Visit Summary was printed and given to the patient.

## 2018-10-25 LAB
ALBUMIN SERPL-MCNC: 4.2 G/DL (ref 3.5–5.5)
ALBUMIN/GLOB SERPL: 1.5 {RATIO} (ref 1.2–2.2)
ALP SERPL-CCNC: 55 IU/L (ref 39–117)
ALT SERPL-CCNC: 18 IU/L (ref 0–44)
AST SERPL-CCNC: 23 IU/L (ref 0–40)
BASOPHILS # BLD AUTO: 0 X10E3/UL (ref 0–0.2)
BASOPHILS NFR BLD AUTO: 1 %
BILIRUB SERPL-MCNC: 1.1 MG/DL (ref 0–1.2)
BUN SERPL-MCNC: 11 MG/DL (ref 6–20)
BUN/CREAT SERPL: 13 (ref 9–20)
CALCIUM SERPL-MCNC: 9.3 MG/DL (ref 8.7–10.2)
CHLORIDE SERPL-SCNC: 105 MMOL/L (ref 96–106)
CHOLEST SERPL-MCNC: 114 MG/DL (ref 100–199)
CO2 SERPL-SCNC: 20 MMOL/L (ref 20–29)
CREAT SERPL-MCNC: 0.86 MG/DL (ref 0.76–1.27)
EOSINOPHIL # BLD AUTO: 0.1 X10E3/UL (ref 0–0.4)
EOSINOPHIL NFR BLD AUTO: 4 %
ERYTHROCYTE [DISTWIDTH] IN BLOOD BY AUTOMATED COUNT: 13.2 % (ref 12.3–15.4)
EST. AVERAGE GLUCOSE BLD GHB EST-MCNC: 97 MG/DL
GLOBULIN SER CALC-MCNC: 2.8 G/DL (ref 1.5–4.5)
GLUCOSE SERPL-MCNC: 100 MG/DL (ref 65–99)
HBA1C MFR BLD: 5 % (ref 4.8–5.6)
HCT VFR BLD AUTO: 40.3 % (ref 37.5–51)
HDLC SERPL-MCNC: 36 MG/DL
HGB BLD-MCNC: 14.1 G/DL (ref 13–17.7)
IMM GRANULOCYTES # BLD: 0 X10E3/UL (ref 0–0.1)
IMM GRANULOCYTES NFR BLD: 0 %
LDLC SERPL CALC-MCNC: 61 MG/DL (ref 0–99)
LYMPHOCYTES # BLD AUTO: 1.6 X10E3/UL (ref 0.7–3.1)
LYMPHOCYTES NFR BLD AUTO: 43 %
MCH RBC QN AUTO: 31.1 PG (ref 26.6–33)
MCHC RBC AUTO-ENTMCNC: 35 G/DL (ref 31.5–35.7)
MCV RBC AUTO: 89 FL (ref 79–97)
MONOCYTES # BLD AUTO: 0.3 X10E3/UL (ref 0.1–0.9)
MONOCYTES NFR BLD AUTO: 9 %
NEUTROPHILS # BLD AUTO: 1.5 X10E3/UL (ref 1.4–7)
NEUTROPHILS NFR BLD AUTO: 43 %
PLATELET # BLD AUTO: 208 X10E3/UL (ref 150–379)
POTASSIUM SERPL-SCNC: 4 MMOL/L (ref 3.5–5.2)
PROT SERPL-MCNC: 7 G/DL (ref 6–8.5)
RBC # BLD AUTO: 4.53 X10E6/UL (ref 4.14–5.8)
SODIUM SERPL-SCNC: 141 MMOL/L (ref 134–144)
TRIGL SERPL-MCNC: 86 MG/DL (ref 0–149)
VLDLC SERPL CALC-MCNC: 17 MG/DL (ref 5–40)
WBC # BLD AUTO: 3.6 X10E3/UL (ref 3.4–10.8)

## 2018-10-25 NOTE — PROGRESS NOTES
Please mail letter:    2. CBC, kidney, liver, cholesterol level is normal.     3. Average blood sugar level is normal.

## 2018-11-14 ENCOUNTER — OFFICE VISIT (OUTPATIENT)
Dept: PRIMARY CARE CLINIC | Age: 38
End: 2018-11-14

## 2018-11-14 ENCOUNTER — HOSPITAL ENCOUNTER (OUTPATIENT)
Dept: GENERAL RADIOLOGY | Age: 38
Discharge: HOME | End: 2018-11-14
Payer: COMMERCIAL

## 2018-11-14 VITALS
RESPIRATION RATE: 16 BRPM | HEART RATE: 70 BPM | TEMPERATURE: 98 F | WEIGHT: 181 LBS | SYSTOLIC BLOOD PRESSURE: 114 MMHG | BODY MASS INDEX: 27.43 KG/M2 | HEIGHT: 68 IN | OXYGEN SATURATION: 98 % | DIASTOLIC BLOOD PRESSURE: 68 MMHG

## 2018-11-14 DIAGNOSIS — R10.9 FLANK PAIN: Primary | ICD-10-CM

## 2018-11-14 DIAGNOSIS — R10.9 FLANK PAIN: ICD-10-CM

## 2018-11-14 LAB
BILIRUB UR QL STRIP: NEGATIVE
GLUCOSE UR-MCNC: NEGATIVE MG/DL
KETONES P FAST UR STRIP-MCNC: NEGATIVE MG/DL
PH UR STRIP: 6.5 [PH] (ref 4.6–8)
PROT UR QL STRIP: NEGATIVE
SP GR UR STRIP: 1.01 (ref 1–1.03)
UA UROBILINOGEN AMB POC: NORMAL (ref 0.2–1)
URINALYSIS CLARITY POC: CLEAR
URINALYSIS COLOR POC: YELLOW
URINE BLOOD POC: NEGATIVE
URINE LEUKOCYTES POC: NEGATIVE
URINE NITRITES POC: NEGATIVE

## 2018-11-14 PROCEDURE — 74018 RADEX ABDOMEN 1 VIEW: CPT

## 2018-11-14 NOTE — PROGRESS NOTES
Chief Complaint   Patient presents with    Back Pain     states that he has a pain in the left not right side of lower back and has a cold. denies any problems or symptoms urinating and has itching on the body.

## 2018-11-14 NOTE — PROGRESS NOTES
Subjective:     Chief Complaint   Patient presents with    Back Pain     states that he has a pain in the left not right side of lower back and has a cold. denies any problems or symptoms urinating and has itching on the body. He  is a 30y.o. year old male with no medical history who presents today with a c/o constant mild achy pain in his left flank area for the past two days. Pain does not trigger by ROM. He reports that yesterday the pain was bad but he is better today Pain does not radiates. Denies any pelvic pain, urinary symptoms. He did not try any pain med or any home conservative management. Pertinent items are noted in HPI. Objective:     Vitals:    11/14/18 0918   BP: 114/68   Pulse: 70   Resp: 16   Temp: 98 °F (36.7 °C)   TempSrc: Oral   SpO2: 98%   Weight: 181 lb (82.1 kg)   Height: 5' 8\" (1.727 m)       Physical Examination: General appearance - alert, well appearing, and in no distress, oriented to person, place, and time and overweight  Mental status - alert, oriented to person, place, and time, normal mood, behavior, speech, dress, motor activity, and thought processes  Chest - clear to auscultation, no wheezes, rales or rhonchi, symmetric air entry  Heart - normal rate, regular rhythm, normal S1, S2, no murmurs, rubs, clicks or gallops  Abdomen - soft, nontender, nondistended, no masses or organomegaly. Back: ROM is normal. Non tender.    no CVA tenderness    No Known Allergies   Social History     Socioeconomic History    Marital status:      Spouse name: Not on file    Number of children: Not on file    Years of education: Not on file    Highest education level: Not on file   Social Needs    Financial resource strain: Not on file    Food insecurity - worry: Not on file    Food insecurity - inability: Not on file    Transportation needs - medical: Not on file   Tianji needs - non-medical: Not on file   Occupational History    Not on file   Tobacco Use    Smoking status: Never Smoker    Smokeless tobacco: Current User   Substance and Sexual Activity    Alcohol use: No    Drug use: No    Sexual activity: Yes     Partners: Female   Other Topics Concern    Not on file   Social History Narrative    Not on file      Family History   Problem Relation Age of Onset    No Known Problems Mother     No Known Problems Father       History reviewed. No pertinent surgical history. History reviewed. No pertinent past medical history. Current Outpatient Medications   Medication Sig Dispense Refill    naproxen (NAPROSYN) 500 mg tablet TAKE 1 TABLET BY MOUTH TWICE A DAY WITH FOOD 30 Tab 0        Assessment/ Plan:   Diagnoses and all orders for this visit:    1. Flank pain  -     AMB POC URINALYSIS DIP STICK AUTO W/O MICRO is normal.         Most likely musculoskeletal.         Patient has naproxen at home. Advised patient to take naproxen BID for the next 4-5 days and see how he feels. Follow up if symptoms worsen. -     XR ABD (KUB); Future              IMPRESSION  IMPRESSION: Gas pattern is normal. There are few calcifications left pelvis  nonspecific but could be phleboliths although distal calculus is not entirely  Excluded. Will consider CT scan if he continue to have symptoms.              Medication risks/benefits/costs/interactions/alternatives discussed with patient. Advised patient to call back or return to office if symptoms worsen/change/persist. If patient cannot reach us or should anything more severe/urgent arise he/she should proceed directly to the nearest emergency department. Discussed expected course/resolution/complications of diagnosis in detail with patient. Patient given a written after visit summary which includes her diagnoses, current medications and vitals. Patient expressed understanding with the diagnosis and plan. Follow-up Disposition:  Return if symptoms worsen or fail to improve.

## 2018-11-15 ENCOUNTER — TELEPHONE (OUTPATIENT)
Dept: PRIMARY CARE CLINIC | Age: 38
End: 2018-11-15

## 2018-11-15 NOTE — TELEPHONE ENCOUNTER
----- Message from Eric Chery MD sent at 11/15/2018 12:21 PM EST -----  Please call patient:    Xray did not show any obvious finding or kidney stone. Take the med as advised . If pain persists then we will consider CT scan if he continue to have symptoms. How is he doing?

## 2018-11-15 NOTE — TELEPHONE ENCOUNTER
Discussed results and recommendations. Pt verbalized his understanding. States that he is at work and doing fine today.

## 2018-11-15 NOTE — PROGRESS NOTES
Please call patient:    Xray did not show any obvious finding or kidney stone. Take the med as advised . If pain persists then we will consider CT scan if he continue to have symptoms. How is he doing?

## 2019-01-18 ENCOUNTER — CLINICAL SUPPORT (OUTPATIENT)
Dept: PRIMARY CARE CLINIC | Age: 39
End: 2019-01-18

## 2019-01-18 VITALS
DIASTOLIC BLOOD PRESSURE: 71 MMHG | OXYGEN SATURATION: 98 % | SYSTOLIC BLOOD PRESSURE: 113 MMHG | HEART RATE: 74 BPM | RESPIRATION RATE: 18 BRPM | HEIGHT: 68 IN | TEMPERATURE: 98.4 F | BODY MASS INDEX: 27.43 KG/M2 | WEIGHT: 181 LBS

## 2019-01-18 DIAGNOSIS — R76.11 POSITIVE SKIN TEST FOR TUBERCULOSIS: Primary | ICD-10-CM

## 2019-01-18 NOTE — PROGRESS NOTES
Chief Complaint   Patient presents with    PPD Reading     Patient had a positive TB skin test on 12/26/18. PPD measurement was 15 mm, but chest xray was normal.    1. Have you experienced any of the following symptoms in the past year?  a.) A productive cough for more than 3 weeks? No  b.) Hemoptysis (coughing up blood)? No  c.) Unexplained weight loss? No  d.) Fever, Chills, or night sweats for no known reason? No  e.) Persistent shortness of breath? No   f.) Unexplained fatigue? No  g.) Chest Pain? No  2. Have you had contact with anyone with active tuberculosis disease in the past year? No  3) Do you have a medical condition, or are you taking medications, which suppress  your immune system? No    Patient evaluated by Beryl De La O. INH not indicated at this time.

## 2019-03-20 ENCOUNTER — OFFICE VISIT (OUTPATIENT)
Dept: PRIMARY CARE CLINIC | Age: 39
End: 2019-03-20

## 2019-03-20 VITALS
SYSTOLIC BLOOD PRESSURE: 123 MMHG | TEMPERATURE: 98.1 F | BODY MASS INDEX: 28.49 KG/M2 | RESPIRATION RATE: 16 BRPM | HEIGHT: 68 IN | WEIGHT: 188 LBS | DIASTOLIC BLOOD PRESSURE: 76 MMHG | OXYGEN SATURATION: 99 % | HEART RATE: 68 BPM

## 2019-03-20 DIAGNOSIS — F43.21 SITUATIONAL DEPRESSION: Primary | ICD-10-CM

## 2019-03-20 NOTE — PROGRESS NOTES
Chief Complaint   Patient presents with    Depression     1 wk ago started to feel depressed. 1. Have you been to the ER, urgent care clinic since your last visit? Hospitalized since your last visit? No    2. Have you seen or consulted any other health care providers outside of the [address] [place] [address] since your last visit? Include any pap smears or colon screening.  No

## 2019-03-20 NOTE — PROGRESS NOTES
Subjective:     Chief Complaint   Patient presents with    Depression     1 wk ago started to feel depressed. He  is a 41y.o. year old male with no significant medical hx who presents today with a c/o feeling down since he started his new job. He started his new job as an  about a week ago. For the first two days she was hopeless and felt very depressed as well as having sleeping trouble but for the past two days he is feeling better. He had good sleep last night and today when he went to work he he felt less stressed out. Pertinent items are noted in HPI. Objective:     Vitals:    03/20/19 1530   BP: 123/76   Pulse: 68   Resp: 16   Temp: 98.1 °F (36.7 °C)   TempSrc: Oral   SpO2: 99%   Weight: 188 lb (85.3 kg)   Height: 5' 8\" (1.727 m)       Physical Examination: General appearance - alert, well appearing, and in no distress, oriented to person, place, and time and overweight  Mental status - alert, oriented to person, place, and time, normal mood, behavior, speech, dress, motor activity, and thought processes  Chest - clear to auscultation, no wheezes, rales or rhonchi, symmetric air entry  Heart - normal rate, regular rhythm, normal S1, S2, no murmurs, rubs, clicks or gallops    No Known Allergies   Social History     Socioeconomic History    Marital status:      Spouse name: Not on file    Number of children: Not on file    Years of education: Not on file    Highest education level: Not on file   Tobacco Use    Smoking status: Light Tobacco Smoker    Smokeless tobacco: Never Used    Tobacco comment: 1-2 cigs a day   Substance and Sexual Activity    Alcohol use: No    Drug use: No    Sexual activity: Yes     Partners: Female      Family History   Problem Relation Age of Onset    No Known Problems Mother     No Known Problems Father       History reviewed. No pertinent surgical history. History reviewed. No pertinent past medical history.    Current Outpatient Medications   Medication Sig Dispense Refill    naproxen (NAPROSYN) 500 mg tablet TAKE 1 TABLET BY MOUTH TWICE A DAY WITH FOOD 30 Tab 0        Assessment/ Plan:   Diagnoses and all orders for this visit:    1. Situational depression       -  Reports that he is feeling better. -    Counseling provided. -   Discussed about medication/therapy options but since he started feeling better already pt defer any Rx at this point. Patient will notify if anything worsen. - Instructed patient to call the clinic, and if after hours call the provider on call if experiences any suicidal thought or ideas to hurt herself or other. Also instructed to call 911 or go to the ED       Follow up if symptoms worsen. Medication risks/benefits/costs/interactions/alternatives discussed with patient. Advised patient to call back or return to office if symptoms worsen/change/persist. If patient cannot reach us or should anything more severe/urgent arise he/she should proceed directly to the nearest emergency department. Discussed expected course/resolution/complications of diagnosis in detail with patient. Patient given a written after visit summary which includes her diagnoses, current medications and vitals. Patient expressed understanding with the diagnosis and plan.

## 2019-05-07 ENCOUNTER — OFFICE VISIT (OUTPATIENT)
Dept: PRIMARY CARE CLINIC | Age: 39
End: 2019-05-07

## 2019-05-07 VITALS
DIASTOLIC BLOOD PRESSURE: 69 MMHG | HEIGHT: 68 IN | BODY MASS INDEX: 28.04 KG/M2 | TEMPERATURE: 98.6 F | WEIGHT: 185 LBS | HEART RATE: 65 BPM | RESPIRATION RATE: 17 BRPM | OXYGEN SATURATION: 99 % | SYSTOLIC BLOOD PRESSURE: 108 MMHG

## 2019-05-07 DIAGNOSIS — R05.9 COUGH: Primary | ICD-10-CM

## 2019-05-07 RX ORDER — GUAIFENESIN 600 MG/1
600 TABLET, EXTENDED RELEASE ORAL 2 TIMES DAILY
Qty: 14 TAB | Refills: 0 | Status: SHIPPED | OUTPATIENT
Start: 2019-05-07 | End: 2019-05-14

## 2019-05-07 NOTE — PROGRESS NOTES
SUBJECTIVE:   Davina Cai is a 41 y.o. male who complains of post nasal drip and dry cough for 14 days. He denies a history of chest pain, dizziness, shortness of breath and wheezing and denies a history of asthma. Patient does not smoke cigarettes. Having dry cough for the past two weeks. Denies any fever, chills, chest pain. Cough is getting better. Did not try any medication for the        OBJECTIVE:  Visit Vitals  /69 (BP 1 Location: Left arm, BP Patient Position: Sitting)   Pulse 65   Temp 98.6 °F (37 °C) (Oral)   Resp 17   Ht 5' 8\" (1.727 m)   Wt 185 lb (83.9 kg)   SpO2 99%   BMI 28.13 kg/m²       He appears well, vital signs are as noted. Ears normal.  Throat and pharynx normal.  Neck supple. No adenopathy in the neck. Nose is congested. Sinuses non tender. The chest is clear, without wheezes or rales. ASSESSMENT:   viral upper respiratory illness    PLAN:  Symptomatic therapy suggested: push fluids, rest, gargle warm salt water and return office visit prn if symptoms persist or worsen. Lack of antibiotic effectiveness discussed with him. Call or return to clinic prn if these symptoms worsen or fail to improve as anticipated. Diagnoses and all orders for this visit:    Cough  -     guaiFENesin ER (MUCINEX) 600 mg ER tablet; Take 1 Tab by mouth two (2) times a day for 7 days. , Normal, Disp-14 Tab, R-0          Mucinex sent to pharmacy.

## 2019-05-20 ENCOUNTER — OFFICE VISIT (OUTPATIENT)
Dept: PRIMARY CARE CLINIC | Age: 39
End: 2019-05-20

## 2019-05-20 VITALS
HEART RATE: 69 BPM | WEIGHT: 183 LBS | RESPIRATION RATE: 17 BRPM | TEMPERATURE: 98.7 F | OXYGEN SATURATION: 96 % | HEIGHT: 68 IN | DIASTOLIC BLOOD PRESSURE: 70 MMHG | BODY MASS INDEX: 27.74 KG/M2 | SYSTOLIC BLOOD PRESSURE: 108 MMHG

## 2019-05-20 DIAGNOSIS — J34.89 SINUS PAIN: ICD-10-CM

## 2019-05-20 DIAGNOSIS — J34.89 NASAL DISCHARGE: ICD-10-CM

## 2019-05-20 DIAGNOSIS — J02.9 SORE THROAT: Primary | ICD-10-CM

## 2019-05-20 DIAGNOSIS — J20.9 ACUTE BRONCHITIS, UNSPECIFIED ORGANISM: ICD-10-CM

## 2019-05-20 DIAGNOSIS — R05.9 COUGH: ICD-10-CM

## 2019-05-20 DIAGNOSIS — R09.81 NASAL CONGESTION: ICD-10-CM

## 2019-05-20 LAB
S PYO AG THROAT QL: NEGATIVE
VALID INTERNAL CONTROL?: YES

## 2019-05-20 RX ORDER — FLUTICASONE PROPIONATE 50 MCG
2 SPRAY, SUSPENSION (ML) NASAL DAILY
Qty: 1 BOTTLE | Refills: 1 | Status: SHIPPED | OUTPATIENT
Start: 2019-05-20 | End: 2020-12-07 | Stop reason: ALTCHOICE

## 2019-05-20 RX ORDER — AZITHROMYCIN 250 MG/1
TABLET, FILM COATED ORAL
Qty: 6 TAB | Refills: 0 | Status: SHIPPED | OUTPATIENT
Start: 2019-05-20 | End: 2019-05-25

## 2019-05-20 RX ORDER — LORATADINE AND PSEUDOEPHEDRINE 10; 240 MG/1; MG/1
1 TABLET, EXTENDED RELEASE ORAL DAILY
Qty: 30 TAB | Refills: 1 | Status: SHIPPED | OUTPATIENT
Start: 2019-05-20 | End: 2020-12-07 | Stop reason: ALTCHOICE

## 2019-05-20 RX ORDER — METHYLPREDNISOLONE 4 MG/1
TABLET ORAL
Qty: 1 DOSE PACK | Refills: 0 | Status: CANCELLED | OUTPATIENT
Start: 2019-05-20

## 2019-05-20 RX ORDER — PREDNISONE 20 MG/1
20 TABLET ORAL
Qty: 5 TAB | Refills: 0 | Status: SHIPPED | OUTPATIENT
Start: 2019-05-20 | End: 2019-09-25 | Stop reason: ALTCHOICE

## 2019-05-20 NOTE — PROGRESS NOTES
Subjective:     Chief Complaint   Patient presents with    Fever     last night but did not check temp    Cough     productive cough x almost 1 month, no improvement from last time and has gotten worst        He  is a 41y.o. year old male who presents today with a complain of coughing up yellowish phlegm X 1 month. He was here two weeks ago with cough, congestion and prescribed Mucinex. Reports that Mucinex did not help with his symptoms. Reports that his nose been stuffed up, congested, having sinus pressure. Nasal discharge is thick yellowish. Reports that his throat been sore for the past 24 hrs. Reports that last night he felt warm and chills. Pertinent items are noted in HPI.   Objective:     Vitals:    05/20/19 0952   BP: 108/70   Pulse: 69   Resp: 17   Temp: 98.7 °F (37.1 °C)   TempSrc: Oral   SpO2: 96%   Weight: 183 lb (83 kg)   Height: 5' 8\" (1.727 m)       Physical Examination: General appearance - alert, well appearing, and in no distress, oriented to person, place, and time and overweight  Mental status - alert, oriented to person, place, and time, normal mood, behavior, speech, dress, motor activity, and thought processes  Eyes - pupils equal and reactive, extraocular eye movements intact  Ears - bilateral TM's and external ear canals normal  Nose - normal nontender sinuses, mucosal congestion and mucosal erythema  Mouth - mucous membranes moist, pharynx normal without lesions and tonsils hypertrophied with no exudate  Neck - supple, no significant adenopathy  Chest - clear to auscultation, no wheezes, rales or rhonchi, symmetric air entry  Heart - normal rate, regular rhythm, normal S1, S2, no murmurs, rubs, clicks or gallops    No Known Allergies   Social History     Socioeconomic History    Marital status:      Spouse name: Not on file    Number of children: Not on file    Years of education: Not on file    Highest education level: Not on file   Tobacco Use    Smoking status: Light Tobacco Smoker    Smokeless tobacco: Never Used    Tobacco comment: 1-2 cigs a day   Substance and Sexual Activity    Alcohol use: No    Drug use: No    Sexual activity: Yes     Partners: Female      Family History   Problem Relation Age of Onset    No Known Problems Mother     No Known Problems Father       History reviewed. No pertinent surgical history. History reviewed. No pertinent past medical history. Assessment/ Plan:   Diagnoses and all orders for this visit:    1. Sore throat  -     AMB POC RAPID STREP A  -     CULTURE, STREP THROAT    2. Nasal congestion  -     loratadine-pseudoephedrine (CLARITIN-D 24 HOUR)  mg per tablet; Take 1 Tab by mouth daily. -     fluticasone propionate (FLONASE) 50 mcg/actuation nasal spray; 2 Sprays by Both Nostrils route daily. -     predniSONE (DELTASONE) 20 mg tablet; Take 20 mg by mouth daily (with breakfast). 3. Nasal discharge  -     loratadine-pseudoephedrine (CLARITIN-D 24 HOUR)  mg per tablet; Take 1 Tab by mouth daily. -     fluticasone propionate (FLONASE) 50 mcg/actuation nasal spray; 2 Sprays by Both Nostrils route daily. -     predniSONE (DELTASONE) 20 mg tablet; Take 20 mg by mouth daily (with breakfast). 4. Sinus pain  -     loratadine-pseudoephedrine (CLARITIN-D 24 HOUR)  mg per tablet; Take 1 Tab by mouth daily. -     fluticasone propionate (FLONASE) 50 mcg/actuation nasal spray; 2 Sprays by Both Nostrils route daily. -     azithromycin (ZITHROMAX) 250 mg tablet; Take 2 tablets today, then take 1 tablet daily  -     predniSONE (DELTASONE) 20 mg tablet; Take 20 mg by mouth daily (with breakfast). 5. Acute bronchitis, unspecified organism  -     azithromycin (ZITHROMAX) 250 mg tablet; Take 2 tablets today, then take 1 tablet daily  -     predniSONE (DELTASONE) 20 mg tablet; Take 20 mg by mouth daily (with breakfast).     6. Cough  -    Cough for the past one month, likely aggravated by seasonal allergy symptoms. Lungs clear. Same as #4   predniSONE (DELTASONE) 20 mg tablet; Take 20 mg by mouth daily (with breakfast). Discussed mist inhalation. Rest  hydration. Medication risks/benefits/costs/interactions/alternatives discussed with patient. Advised patient to call back or return to office if symptoms worsen/change/persist. If patient cannot reach us or should anything more severe/urgent arise he/she should proceed directly to the nearest emergency department. Discussed expected course/resolution/complications of diagnosis in detail with patient. Patient given a written after visit summary which includes her diagnoses, current medications and vitals. Patient expressed understanding with the diagnosis and plan. Follow-up and Dispositions    · Return if symptoms worsen or fail to improve.

## 2019-05-23 LAB — S PYO THROAT QL CULT: NEGATIVE

## 2019-09-25 ENCOUNTER — OFFICE VISIT (OUTPATIENT)
Dept: PRIMARY CARE CLINIC | Age: 39
End: 2019-09-25

## 2019-09-25 VITALS
HEART RATE: 59 BPM | RESPIRATION RATE: 17 BRPM | HEIGHT: 68 IN | TEMPERATURE: 98.4 F | SYSTOLIC BLOOD PRESSURE: 105 MMHG | OXYGEN SATURATION: 99 % | BODY MASS INDEX: 28.49 KG/M2 | WEIGHT: 188 LBS | DIASTOLIC BLOOD PRESSURE: 70 MMHG

## 2019-09-25 DIAGNOSIS — F17.210 LIGHT CIGARETTE SMOKER: ICD-10-CM

## 2019-09-25 DIAGNOSIS — R61 SWEATING INCREASE: Primary | ICD-10-CM

## 2019-09-25 NOTE — PATIENT INSTRUCTIONS
Body Mass Index: Care Instructions  Your Care Instructions    Body mass index (BMI) can help you see if your weight is raising your risk for health problems. It uses a formula to compare how much you weigh with how tall you are. · A BMI lower than 18.5 is considered underweight. · A BMI between 18.5 and 24.9 is considered healthy. · A BMI between 25 and 29.9 is considered overweight. A BMI of 30 or higher is considered obese. If your BMI is in the normal range, it means that you have a lower risk for weight-related health problems. If your BMI is in the overweight or obese range, you may be at increased risk for weight-related health problems, such as high blood pressure, heart disease, stroke, arthritis or joint pain, and diabetes. If your BMI is in the underweight range, you may be at increased risk for health problems such as fatigue, lower protection (immunity) against illness, muscle loss, bone loss, hair loss, and hormone problems. BMI is just one measure of your risk for weight-related health problems. You may be at higher risk for health problems if you are not active, you eat an unhealthy diet, or you drink too much alcohol or use tobacco products. Follow-up care is a key part of your treatment and safety. Be sure to make and go to all appointments, and call your doctor if you are having problems. It's also a good idea to know your test results and keep a list of the medicines you take. How can you care for yourself at home? · Practice healthy eating habits. This includes eating plenty of fruits, vegetables, whole grains, lean protein, and low-fat dairy. · If your doctor recommends it, get more exercise. Walking is a good choice. Bit by bit, increase the amount you walk every day. Try for at least 30 minutes on most days of the week. · Do not smoke. Smoking can increase your risk for health problems. If you need help quitting, talk to your doctor about stop-smoking programs and medicines. These can increase your chances of quitting for good. · Limit alcohol to 2 drinks a day for men and 1 drink a day for women. Too much alcohol can cause health problems. If you have a BMI higher than 25  · Your doctor may do other tests to check your risk for weight-related health problems. This may include measuring the distance around your waist. A waist measurement of more than 40 inches in men or 35 inches in women can increase the risk of weight-related health problems. · Talk with your doctor about steps you can take to stay healthy or improve your health. You may need to make lifestyle changes to lose weight and stay healthy, such as changing your diet and getting regular exercise. If you have a BMI lower than 18.5  · Your doctor may do other tests to check your risk for health problems. · Talk with your doctor about steps you can take to stay healthy or improve your health. You may need to make lifestyle changes to gain or maintain weight and stay healthy, such as getting more healthy foods in your diet and doing exercises to build muscle. Where can you learn more? Go to http://darrick-kecia.info/. Enter S176 in the search box to learn more about \"Body Mass Index: Care Instructions. \"  Current as of: October 13, 2016  Content Version: 11.4  © 1806-6347 [employer], Incorporated. Care instructions adapted under license by [employer] (which disclaims liability or warranty for this information). If you have questions about a medical condition or this instruction, always ask your healthcare professional. [address] any warranty or liability for your use of this information.

## 2019-09-25 NOTE — PROGRESS NOTES
Subjective:     Chief Complaint   Patient presents with    Excessive Sweating     since summer    Nicotine Dependence        He  is a 41y.o. year old male who presents today with a concern about sweating excessively with little activity for the past three months. Denies any chest pain, palpitation, abdominal pain, constipation. He reports that since he started smoking three months ago he started having this sweating. Reports that about two days ago he stopped sweating and now he is feeling better. Pertinent items are noted in HPI. Objective:     Vitals:    09/25/19 1553   BP: 105/70   Pulse: (!) 59   Resp: 17   Temp: 98.4 °F (36.9 °C)   TempSrc: Oral   SpO2: 99%   Weight: 188 lb (85.3 kg)   Height: 5' 8\" (1.727 m)       Physical Examination: General appearance - alert, well appearing, and in no distress, oriented to person, place, and time and overweight  Mental status - alert, oriented to person, place, and time, normal mood, behavior, speech, dress, motor activity, and thought processes  Neck - supple, no significant adenopathy, thyroid exam: thyroid is normal in size without nodules or tenderness  Chest - clear to auscultation, no wheezes, rales or rhonchi, symmetric air entry  Heart - normal rate, regular rhythm, normal S1, S2, no murmurs, rubs, clicks or gallops    No Known Allergies   Social History     Socioeconomic History    Marital status:      Spouse name: Not on file    Number of children: Not on file    Years of education: Not on file    Highest education level: Not on file   Tobacco Use    Smoking status: Light Tobacco Smoker    Smokeless tobacco: Never Used    Tobacco comment: 1-2 cigs a day   Substance and Sexual Activity    Alcohol use: No    Drug use: No    Sexual activity: Yes     Partners: Female      Family History   Problem Relation Age of Onset    No Known Problems Mother     No Known Problems Father       History reviewed. No pertinent surgical history. History reviewed. No pertinent past medical history. Current Outpatient Medications   Medication Sig Dispense Refill    loratadine-pseudoephedrine (CLARITIN-D 24 HOUR)  mg per tablet Take 1 Tab by mouth daily. 30 Tab 1    fluticasone propionate (FLONASE) 50 mcg/actuation nasal spray 2 Sprays by Both Nostrils route daily. 1 Bottle 1        Assessment/ Plan:   Diagnoses and all orders for this visit:    1. Sweating increase  -     THYROID CASCADE PROFILE    2. Light cigarette smoker        -  Stopped smoking for the past two days. He will continue to abstain from smoking cigarettes. Counseling provided. Medication risks/benefits/costs/interactions/alternatives discussed with patient. Advised patient to call back or return to office if symptoms worsen/change/persist. If patient cannot reach us or should anything more severe/urgent arise he/she should proceed directly to the nearest emergency department. Discussed expected course/resolution/complications of diagnosis in detail with patient. Patient given a written after visit summary which includes her diagnoses, current medications and vitals. Patient expressed understanding with the diagnosis and plan. Follow-up and Dispositions    · Return if symptoms worsen or fail to improve. Discussed the patient's BMI with him. The BMI follow up plan is as follows:     dietary management education, guidance, and counseling  encourage exercise  monitor weight      An After Visit Summary was printed and given to the patient.

## 2019-09-26 LAB — TSH SERPL DL<=0.005 MIU/L-ACNC: 3.16 UIU/ML (ref 0.45–4.5)

## 2019-10-31 ENCOUNTER — CLINICAL SUPPORT (OUTPATIENT)
Dept: PRIMARY CARE CLINIC | Age: 39
End: 2019-10-31

## 2019-10-31 VITALS
DIASTOLIC BLOOD PRESSURE: 72 MMHG | WEIGHT: 188 LBS | BODY MASS INDEX: 28.49 KG/M2 | RESPIRATION RATE: 17 BRPM | HEIGHT: 68 IN | SYSTOLIC BLOOD PRESSURE: 110 MMHG | HEART RATE: 61 BPM | OXYGEN SATURATION: 96 % | TEMPERATURE: 98.3 F

## 2019-10-31 DIAGNOSIS — Z23 ENCOUNTER FOR IMMUNIZATION: Primary | ICD-10-CM

## 2019-10-31 NOTE — PROGRESS NOTES
Chief Complaint   Patient presents with    Immunization/Injection     flu shot     Influenza vaccine administered 10/31/2019 by Pancho Castellanos LPN with patient's consent. Patient tolerated procedure well in right deltoid. No reactions noted.

## 2019-11-20 ENCOUNTER — OFFICE VISIT (OUTPATIENT)
Dept: PRIMARY CARE CLINIC | Age: 39
End: 2019-11-20

## 2019-11-20 VITALS
OXYGEN SATURATION: 98 % | SYSTOLIC BLOOD PRESSURE: 100 MMHG | TEMPERATURE: 98.1 F | RESPIRATION RATE: 16 BRPM | DIASTOLIC BLOOD PRESSURE: 64 MMHG | HEART RATE: 57 BPM | WEIGHT: 185.2 LBS | BODY MASS INDEX: 28.07 KG/M2 | HEIGHT: 68 IN

## 2019-11-20 DIAGNOSIS — Z00.00 WELL ADULT ON ROUTINE HEALTH CHECK: Primary | ICD-10-CM

## 2019-11-20 NOTE — PATIENT INSTRUCTIONS
Well Visit, Ages 25 to 48: Care Instructions  Your Care Instructions    Physical exams can help you stay healthy. Your doctor has checked your overall health and may have suggested ways to take good care of yourself. He or she also may have recommended tests. At home, you can help prevent illness with healthy eating, regular exercise, and other steps. Follow-up care is a key part of your treatment and safety. Be sure to make and go to all appointments, and call your doctor if you are having problems. It's also a good idea to know your test results and keep a list of the medicines you take. How can you care for yourself at home? · Reach and stay at a healthy weight. This will lower your risk for many problems, such as obesity, diabetes, heart disease, and high blood pressure. · Get at least 30 minutes of physical activity on most days of the week. Walking is a good choice. You also may want to do other activities, such as running, swimming, cycling, or playing tennis or team sports. Discuss any changes in your exercise program with your doctor. · Do not smoke or allow others to smoke around you. If you need help quitting, talk to your doctor about stop-smoking programs and medicines. These can increase your chances of quitting for good. · Talk to your doctor about whether you have any risk factors for sexually transmitted infections (STIs). Having one sex partner (who does not have STIs and does not have sex with anyone else) is a good way to avoid these infections. · Use birth control if you do not want to have children at this time. Talk with your doctor about the choices available and what might be best for you. · Protect your skin from too much sun. When you're outdoors from 10 a.m. to 4 p.m., stay in the shade or cover up with clothing and a hat with a wide brim. Wear sunglasses that block UV rays. Even when it's cloudy, put broad-spectrum sunscreen (SPF 30 or higher) on any exposed skin.   · See a dentist one or two times a year for checkups and to have your teeth cleaned. · Wear a seat belt in the car. Follow your doctor's advice about when to have certain tests. These tests can spot problems early. For everyone  · Cholesterol. Have the fat (cholesterol) in your blood tested after age 21. Your doctor will tell you how often to have this done based on your age, family history, or other things that can increase your risk for heart disease. · Blood pressure. Have your blood pressure checked during a routine doctor visit. Your doctor will tell you how often to check your blood pressure based on your age, your blood pressure results, and other factors. · Vision. Talk with your doctor about how often to have a glaucoma test.  · Diabetes. Ask your doctor whether you should have tests for diabetes. · Colon cancer. Your risk for colorectal cancer gets higher as you get older. Some experts say that adults should start regular screening at age 48 and stop at age 76. Others say to start before age 48 or continue after age 76. Talk with your doctor about your risk and when to start and stop screening. For women  · Breast exam and mammogram. Talk to your doctor about when you should have a clinical breast exam and a mammogram. Medical experts differ on whether and how often women under 50 should have these tests. Your doctor can help you decide what is right for you. · Cervical cancer screening test and pelvic exam. Begin with a Pap test at age 24. The test often is part of a pelvic exam. Starting at age 27, you may choose to have a Pap test, an HPV test, or both tests at the same time (called co-testing). Talk with your doctor about how often to have testing. · Tests for sexually transmitted infections (STIs). Ask whether you should have tests for STIs. You may be at risk if you have sex with more than one person, especially if your partners do not wear condoms.   For men  · Tests for sexually transmitted infections (STIs). Ask whether you should have tests for STIs. You may be at risk if you have sex with more than one person, especially if you do not wear a condom. · Testicular cancer exam. Ask your doctor whether you should check your testicles regularly. · Prostate exam. Talk to your doctor about whether you should have a blood test (called a PSA test) for prostate cancer. Experts differ on whether and when men should have this test. Some experts suggest it if you are older than 39 and are -American or have a father or brother who got prostate cancer when he was younger than 72. When should you call for help? Watch closely for changes in your health, and be sure to contact your doctor if you have any problems or symptoms that concern you. Where can you learn more? Go to http://darrick-kecia.info/. Enter P072 in the search box to learn more about \"Well Visit, Ages 25 to 48: Care Instructions. \"  Current as of: December 13, 2018  Content Version: 12.2  © 4606-7322 [employer]. Care instructions adapted under license by [employer] (which disclaims liability or warranty for this information). If you have questions about a medical condition or this instruction, always ask your healthcare professional. [place] [address] any warranty or liability for your use of this information.

## 2019-11-20 NOTE — PROGRESS NOTES
Subjective:   Victor Manuel Riggins is a 42 y.o. male presenting for his annual checkup. ROS:  Feeling well. No dyspnea or chest pain on exertion. No abdominal pain, change in bowel habits, black or bloody stools. No urinary tract or prostatic symptoms. No neurological complaints. Specific concerns today: none. Patient Active Problem List   Diagnosis Code    Depression F32.9     Current Outpatient Medications   Medication Sig Dispense Refill    loratadine-pseudoephedrine (CLARITIN-D 24 HOUR)  mg per tablet Take 1 Tab by mouth daily. 30 Tab 1    fluticasone propionate (FLONASE) 50 mcg/actuation nasal spray 2 Sprays by Both Nostrils route daily. 1 Bottle 1     No Known Allergies  History reviewed. No pertinent past medical history. History reviewed. No pertinent surgical history.   Family History   Problem Relation Age of Onset    No Known Problems Mother     No Known Problems Father      Social History     Tobacco Use    Smoking status: Light Tobacco Smoker    Smokeless tobacco: Never Used    Tobacco comment: 1-2 cigs a day   Substance Use Topics    Alcohol use: No        Lab Results   Component Value Date/Time    WBC 3.6 10/24/2018 08:46 AM    HGB 14.1 10/24/2018 08:46 AM    HCT 40.3 10/24/2018 08:46 AM    PLATELET 569 21/10/2343 08:46 AM    MCV 89 10/24/2018 08:46 AM     Lab Results   Component Value Date/Time    Cholesterol, total 114 10/24/2018 08:46 AM    HDL Cholesterol 36 (L) 10/24/2018 08:46 AM    LDL, calculated 61 10/24/2018 08:46 AM    Triglyceride 86 10/24/2018 08:46 AM     Lab Results   Component Value Date/Time    GFR est non- 10/24/2018 08:46 AM    GFR est  10/24/2018 08:46 AM    Creatinine 0.86 10/24/2018 08:46 AM    BUN 11 10/24/2018 08:46 AM    Sodium 141 10/24/2018 08:46 AM    Potassium 4.0 10/24/2018 08:46 AM    Chloride 105 10/24/2018 08:46 AM    CO2 20 10/24/2018 08:46 AM     Lab Results   Component Value Date/Time    TSH 3.160 09/25/2019 04:20 PM    TSH 3.990 03/22/2018 08:07 AM    T4, Free 1.29 03/22/2018 08:07 AM      Lab Results   Component Value Date/Time    Hemoglobin A1c 5.0 10/24/2018 08:46 AM         Objective:     Visit Vitals  /64 (BP 1 Location: Left arm, BP Patient Position: Sitting)   Pulse (!) 57   Temp 98.1 °F (36.7 °C) (Oral)   Resp 16   Ht 5' 8\" (1.727 m)   Wt 185 lb 3.2 oz (84 kg)   SpO2 98%   BMI 28.16 kg/m²     The patient appears well, alert, oriented x 3, in no distress. ENT normal.  Neck supple. No adenopathy or thyromegaly. CHRISTIANO. Lungs are clear, good air entry, no wheezes, rhonchi or rales. S1 and S2 normal, no murmurs, regular rate and rhythm. Abdomen is soft without tenderness, guarding, mass or organomegaly.  exam: deferred. Extremities show no edema, normal peripheral pulses. Neurological is normal without focal findings. Assessment/Plan:   healthy adult male  lose weight, increase physical activity, follow low fat diet, follow low salt diet, continue present plan, routine labs ordered, call if any problems. ICD-10-CM ICD-9-CM    1. Well adult on routine health check Z00.00 V70.0 LIPID PANEL      METABOLIC PANEL, COMPREHENSIVE      HEMOGLOBIN A1C WITH EAG      THYROID CASCADE PROFILE      CBC WITH AUTOMATED DIFF     Diagnoses and all orders for this visit:    1. Well adult on routine health check  -     LIPID PANEL  -     METABOLIC PANEL, COMPREHENSIVE  -     HEMOGLOBIN A1C WITH EAG  -     THYROID CASCADE PROFILE  -     CBC WITH AUTOMATED DIFF      Follow-up and Dispositions    · Return in about 1 year (around 11/20/2020), or if symptoms worsen or fail to improve. current treatment plan is effective, no change in therapy  reviewed diet, exercise and weight control.

## 2019-11-21 LAB
ALBUMIN SERPL-MCNC: 4.3 G/DL (ref 3.5–5.5)
ALBUMIN/GLOB SERPL: 1.8 {RATIO} (ref 1.2–2.2)
ALP SERPL-CCNC: 55 IU/L (ref 39–117)
ALT SERPL-CCNC: 27 IU/L (ref 0–44)
AST SERPL-CCNC: 35 IU/L (ref 0–40)
BASOPHILS # BLD AUTO: 0 X10E3/UL (ref 0–0.2)
BASOPHILS NFR BLD AUTO: 0 %
BILIRUB SERPL-MCNC: 1.5 MG/DL (ref 0–1.2)
BUN SERPL-MCNC: 12 MG/DL (ref 6–20)
BUN/CREAT SERPL: 15 (ref 9–20)
CALCIUM SERPL-MCNC: 9.3 MG/DL (ref 8.7–10.2)
CHLORIDE SERPL-SCNC: 102 MMOL/L (ref 96–106)
CHOLEST SERPL-MCNC: 125 MG/DL (ref 100–199)
CO2 SERPL-SCNC: 22 MMOL/L (ref 20–29)
CREAT SERPL-MCNC: 0.8 MG/DL (ref 0.76–1.27)
EOSINOPHIL # BLD AUTO: 0.3 X10E3/UL (ref 0–0.4)
EOSINOPHIL NFR BLD AUTO: 5 %
ERYTHROCYTE [DISTWIDTH] IN BLOOD BY AUTOMATED COUNT: 12.3 % (ref 12.3–15.4)
EST. AVERAGE GLUCOSE BLD GHB EST-MCNC: 94 MG/DL
GLOBULIN SER CALC-MCNC: 2.4 G/DL (ref 1.5–4.5)
GLUCOSE SERPL-MCNC: 95 MG/DL (ref 65–99)
HBA1C MFR BLD: 4.9 % (ref 4.8–5.6)
HCT VFR BLD AUTO: 42.3 % (ref 37.5–51)
HDLC SERPL-MCNC: 36 MG/DL
HGB BLD-MCNC: 14.7 G/DL (ref 13–17.7)
IMM GRANULOCYTES # BLD AUTO: 0 X10E3/UL (ref 0–0.1)
IMM GRANULOCYTES NFR BLD AUTO: 0 %
LDLC SERPL CALC-MCNC: 71 MG/DL (ref 0–99)
LYMPHOCYTES # BLD AUTO: 2.2 X10E3/UL (ref 0.7–3.1)
LYMPHOCYTES NFR BLD AUTO: 43 %
MCH RBC QN AUTO: 31.5 PG (ref 26.6–33)
MCHC RBC AUTO-ENTMCNC: 34.8 G/DL (ref 31.5–35.7)
MCV RBC AUTO: 91 FL (ref 79–97)
MONOCYTES # BLD AUTO: 0.6 X10E3/UL (ref 0.1–0.9)
MONOCYTES NFR BLD AUTO: 11 %
NEUTROPHILS # BLD AUTO: 2.2 X10E3/UL (ref 1.4–7)
NEUTROPHILS NFR BLD AUTO: 41 %
PLATELET # BLD AUTO: 247 X10E3/UL (ref 150–450)
POTASSIUM SERPL-SCNC: 4.4 MMOL/L (ref 3.5–5.2)
PROT SERPL-MCNC: 6.7 G/DL (ref 6–8.5)
RBC # BLD AUTO: 4.66 X10E6/UL (ref 4.14–5.8)
SODIUM SERPL-SCNC: 138 MMOL/L (ref 134–144)
TRIGL SERPL-MCNC: 90 MG/DL (ref 0–149)
TSH SERPL DL<=0.005 MIU/L-ACNC: 2.43 UIU/ML (ref 0.45–4.5)
VLDLC SERPL CALC-MCNC: 18 MG/DL (ref 5–40)
WBC # BLD AUTO: 5.2 X10E3/UL (ref 3.4–10.8)

## 2019-11-21 NOTE — PROGRESS NOTES
Please mail letter:     1. CBC, kidney, liver, thyroid, cholesterol level is normal.      Nothing concerning. 2. No diabetes.

## 2020-05-06 ENCOUNTER — VIRTUAL VISIT (OUTPATIENT)
Dept: PRIMARY CARE CLINIC | Age: 40
End: 2020-05-06

## 2020-05-06 DIAGNOSIS — F17.200 SMOKING: ICD-10-CM

## 2020-05-06 DIAGNOSIS — G47.9 SLEEP DIFFICULTIES: Primary | ICD-10-CM

## 2020-05-06 RX ORDER — DIPHENHYDRAMINE HCL 25 MG
25 CAPSULE ORAL
Qty: 30 CAP | Refills: 0 | Status: SHIPPED | OUTPATIENT
Start: 2020-05-06 | End: 2020-12-07 | Stop reason: ALTCHOICE

## 2020-05-06 NOTE — PROGRESS NOTES
Amaris Ayala is a 42 y.o. male who was seen by synchronous (real-time) audio-video technology on 5/6/2020. Consent: Amaris Ayala, who was seen by synchronous (real-time) audio-video technology, and/or his healthcare decision maker, is aware that this patient-initiated, Telehealth encounter on 5/6/2020 is a billable service, with coverage as determined by his insurance carrier. He is aware that he may receive a bill and has provided verbal consent to proceed: Yes. I was in the office while conducting this encounter. This virtual visit was conducted via [employer]. Pursuant to the emergency declaration under the [facility], [facility] waiver authority and the [employer] Act, this Virtual  Visit was conducted to reduce the patient's risk of exposure to COVID-19 and provide continuity of care for an established patient. Services were provided through a video synchronous discussion virtually to substitute for in-person clinic visit. Due to this being a TeleHealth evaluation, many elements of the physical examination are unable to be assessed. Assessment & Plan:     Diagnoses and all orders for this visit:    1. Sleep difficulties  -    Start  diphenhydrAMINE (BenadryL) 25 mg capsule; Take 1 Cap by mouth nightly as needed for Sleep (sleep). 2. Smoking      Counseling provided. Offered nicotine gum to try. He states that he has some in his car. Discussed about starting Chantix once Ramadan month is done. Follow-up and Dispositions    · Return in about 1 month (around 6/6/2020), or if symptoms worsen or fail to improve, for smoking cessation.  .           Subjective:   Amaris Ayala is a 42 y.o. male who was seen for Nicotine Dependence (he felt palpitaion after smoking elctronic cigerette. ) and Sleep Problem      41 y/o male is here with a concern about feeling palpitation and chest tightness after smoking electronic cigarette today as well as couple of days ago. He is feeling fine right now. Patient reports that for the past few nights he has been having sleeping difficulties. He thinks anxiety is kicking in due to this COVID-19 issues. Prior to Admission medications    Medication Sig Start Date End Date Taking? Authorizing Provider   diphenhydrAMINE (BenadryL) 25 mg capsule Take 1 Cap by mouth nightly as needed for Sleep (sleep). 5/6/20  Yes Melvin Edmond MD   loratadine-pseudoephedrine (CLARITIN-D 24 HOUR)  mg per tablet Take 1 Tab by mouth daily. 5/20/19   Melvin Edmond MD   fluticasone propionate (FLONASE) 50 mcg/actuation nasal spray 2 Sprays by Both Nostrils route daily. 5/20/19   Mechelle Edmond MD     No Known Allergies    Patient Active Problem List   Diagnosis Code    Depression F32.9     Current Outpatient Medications   Medication Sig Dispense Refill    diphenhydrAMINE (BenadryL) 25 mg capsule Take 1 Cap by mouth nightly as needed for Sleep (sleep). 30 Cap 0    loratadine-pseudoephedrine (CLARITIN-D 24 HOUR)  mg per tablet Take 1 Tab by mouth daily. 30 Tab 1    fluticasone propionate (FLONASE) 50 mcg/actuation nasal spray 2 Sprays by Both Nostrils route daily. 1 Bottle 1     No Known Allergies  No past medical history on file. Review of Systems   Constitutional: Negative for chills and fever. Respiratory: Negative for cough. Cardiovascular: Positive for palpitations. Negative for chest pain. Psychiatric/Behavioral: The patient has insomnia. Objective:   Vital Signs: (As obtained by patient/caregiver at home)  There were no vitals taken for this visit.      [INSTRUCTIONS:  \"[x]\" Indicates a positive item  \"[]\" Indicates a negative item  -- DELETE ALL ITEMS NOT EXAMINED]    Constitutional: [x] Appears well-developed and well-nourished [x] No apparent distress      [] Abnormal -     Mental status: [x] Alert and awake  [x] Oriented to person/place/time [x] Able to follow commands    [] Abnormal -     Eyes:   EOM    [x]  Normal    [] Abnormal -   Sclera  [x]  Normal    [] Abnormal -          Discharge [x]  None visible   [] Abnormal -     HENT: [x] Normocephalic, atraumatic  [] Abnormal -   [] Mouth/Throat: Mucous membranes are moist    External Ears [x] Normal  [] Abnormal -    Neck: [x] No visualized mass [] Abnormal -     Pulmonary/Chest: [x] Respiratory effort normal   [x] No visualized signs of difficulty breathing or respiratory distress        [] Abnormal -      Musculoskeletal:   [] Normal gait with no signs of ataxia         [x] Normal range of motion of neck        [] Abnormal -     Neurological:        [x] No Facial Asymmetry (Cranial nerve 7 motor function) (limited exam due to video visit)          [x] No gaze palsy        [] Abnormal -          Skin:        [x] No significant exanthematous lesions or discoloration noted on facial skin         [] Abnormal -            Psychiatric:       [x] Normal Affect [] Abnormal -        [] No Hallucinations    Other pertinent observable physical exam findings:-        We discussed the expected course, resolution and complications of the diagnosis(es) in detail. Medication risks, benefits, costs, interactions, and alternatives were discussed as indicated. I advised him to contact the office if his condition worsens, changes or fails to improve as anticipated. He expressed understanding with the diagnosis(es) and plan. Yahaira Hernandez is a 42 y.o. male who was evaluated by a video visit encounter for concerns as above. Patient identification was verified prior to start of the visit. A caregiver was present when appropriate. Due to this being a TeleHealth encounter (During [employer]-30 public health emergency), evaluation of the following organ systems was limited: Vitals/Constitutional/EENT/Resp/CV/GI//MS/Neuro/Skin/Heme-Lymph-Imm.   Pursuant to the emergency declaration under the [address] Emergencies Act, 1135 waiver authority and the Coronavirus Preparedness and Response Supplemental Appropriations Act, this Virtual  Visit was conducted, with patient's (and/or legal guardian's) consent, to reduce the patient's risk of exposure to COVID-19 and provide necessary medical care. Services were provided through a video synchronous discussion virtually to substitute for in-person clinic visit.          Quan Lyles MD

## 2020-11-10 ENCOUNTER — OFFICE VISIT (OUTPATIENT)
Dept: PRIMARY CARE CLINIC | Age: 40
End: 2020-11-10
Payer: COMMERCIAL

## 2020-11-10 VITALS
WEIGHT: 184.6 LBS | DIASTOLIC BLOOD PRESSURE: 69 MMHG | RESPIRATION RATE: 18 BRPM | HEIGHT: 68 IN | SYSTOLIC BLOOD PRESSURE: 111 MMHG | TEMPERATURE: 98 F | OXYGEN SATURATION: 98 % | BODY MASS INDEX: 27.98 KG/M2 | HEART RATE: 72 BPM

## 2020-11-10 DIAGNOSIS — M54.9 MID-BACK PAIN, ACUTE: Primary | ICD-10-CM

## 2020-11-10 PROCEDURE — 99214 OFFICE O/P EST MOD 30 MIN: CPT | Performed by: FAMILY MEDICINE

## 2020-11-10 RX ORDER — IBUPROFEN 600 MG/1
600 TABLET ORAL
Qty: 30 TAB | Refills: 0 | Status: SHIPPED | OUTPATIENT
Start: 2020-11-10 | End: 2020-12-07 | Stop reason: ALTCHOICE

## 2020-11-10 NOTE — PROGRESS NOTES
Subjective:     Chief Complaint   Patient presents with    Back Pain     lower        He  is a 42y.o. year old male present with mild achy pain in his mid back for the past one week. Leonel Brooks He reports that the pain is not too much so he did not take any med. No injury. Pain does not radiates. Reports that his current job requires him to sit about 8-10 hours /day which may be a factor. Pertinent items are noted in HPI. Objective:     Vitals:    11/10/20 1209   BP: 111/69   Pulse: 72   Resp: 18   Temp: 98 °F (36.7 °C)   TempSrc: Oral   SpO2: 98%   Weight: 184 lb 9.6 oz (83.7 kg)   Height: 5' 8\" (1.727 m)       Physical Examination: General appearance - alert, well appearing, and in no distress, oriented to person, place, and time and overweight  Mental status - alert, oriented to person, place, and time, normal mood, behavior, speech, dress, motor activity, and thought processes  Back exam - Normal gait. TTP over lower thoracic paraspinal muscle. Flexion, extension was normal.  Neurological - alert, oriented, normal speech, no focal findings or movement disorder noted, DTR's normal and symmetric    No Known Allergies   Social History     Socioeconomic History    Marital status:      Spouse name: Not on file    Number of children: Not on file    Years of education: Not on file    Highest education level: Not on file   Tobacco Use    Smoking status: Light Tobacco Smoker    Smokeless tobacco: Never Used    Tobacco comment: 1-2 cigs a day   Substance and Sexual Activity    Alcohol use: No    Drug use: No    Sexual activity: Yes     Partners: Female      Family History   Problem Relation Age of Onset    No Known Problems Mother     No Known Problems Father       History reviewed. No pertinent surgical history. History reviewed. No pertinent past medical history.    Current Outpatient Medications   Medication Sig Dispense Refill    diphenhydrAMINE (BenadryL) 25 mg capsule Take 1 Cap by mouth nightly as needed for Sleep (sleep). 30 Cap 0    loratadine-pseudoephedrine (CLARITIN-D 24 HOUR)  mg per tablet Take 1 Tab by mouth daily. 30 Tab 1    fluticasone propionate (FLONASE) 50 mcg/actuation nasal spray 2 Sprays by Both Nostrils route daily. 1 Bottle 1        Assessment/ Plan:   Diagnoses and all orders for this visit:    1. Mid-back pain, acute  -    Mild pain in his mid back. Heat and ice alternatively. Massage with OTC Topical pain med. Deep massage ibuprofen (MOTRIN) 600 mg tablet; Take 1 Tab by mouth every eight (8) hours as needed for Pain. Medication risks/benefits/costs/interactions/alternatives discussed with patient. Advised patient to call back or return to office if symptoms worsen/change/persist. If patient cannot reach us or should anything more severe/urgent arise he/she should proceed directly to the nearest emergency department. Discussed expected course/resolution/complications of diagnosis in detail with patient. Patient given a written after visit summary which includes her diagnoses, current medications and vitals. Patient expressed understanding with the diagnosis and plan. Follow-up and Dispositions    · Return in about 1 month (around 12/10/2020), or if symptoms worsen or fail to improve, for complete physical  and fasting blood work. Tonye Cogan

## 2020-11-10 NOTE — PROGRESS NOTES
Chief Complaint   Patient presents with    Back Pain     lower       Pt states he has been having lower back pain for over a week now. 1. Have you been to the ER, urgent care clinic since your last visit? Hospitalized since your last visit? No    2. Have you seen or consulted any other health care providers outside of the [address] [place] [address] since your last visit? Include any pap smears or colon screening.  No    Visit Vitals  /69 (BP 1 Location: Left arm, BP Patient Position: Sitting)   Pulse 72   Temp 98 °F (36.7 °C) (Oral)   Resp 18   Ht 5' 8\" (1.727 m)   Wt 184 lb 9.6 oz (83.7 kg)   SpO2 98%   BMI 28.07 kg/m²

## 2020-12-07 ENCOUNTER — OFFICE VISIT (OUTPATIENT)
Dept: PRIMARY CARE CLINIC | Age: 40
End: 2020-12-07
Payer: COMMERCIAL

## 2020-12-07 VITALS
WEIGHT: 186.2 LBS | HEART RATE: 71 BPM | DIASTOLIC BLOOD PRESSURE: 63 MMHG | RESPIRATION RATE: 17 BRPM | SYSTOLIC BLOOD PRESSURE: 102 MMHG | HEIGHT: 68 IN | OXYGEN SATURATION: 99 % | BODY MASS INDEX: 28.22 KG/M2 | TEMPERATURE: 98.8 F

## 2020-12-07 DIAGNOSIS — Z00.00 WELL ADULT ON ROUTINE HEALTH CHECK: Primary | ICD-10-CM

## 2020-12-07 PROCEDURE — 99395 PREV VISIT EST AGE 18-39: CPT | Performed by: FAMILY MEDICINE

## 2020-12-07 RX ORDER — ACETAMINOPHEN 325 MG/1
TABLET ORAL
COMMUNITY
End: 2022-07-15

## 2020-12-07 NOTE — PROGRESS NOTES
Subjective:   Varsha Miller is a 47 y.o. male presenting for his annual checkup. ROS:  Feeling well. No dyspnea or chest pain on exertion. No abdominal pain, change in bowel habits, black or bloody stools. No urinary tract or prostatic symptoms. No neurological complaints. Specific concerns today: patient reports that he sometimes gets depressed during the winter time. Its not too bad however he would like to see a counselor. I provided the list of the counseling services. Strongly advised to follow up if anything worsen. He smokes 1-2 cig every 2-3 weeks . States that he smokes when he gets depressed . I offered nicotine gum but he states that he has that at home. Patient Active Problem List   Diagnosis Code    Depression F32.9     Current Outpatient Medications   Medication Sig Dispense Refill    acetaminophen (TylenoL) 325 mg tablet Take  by mouth every four (4) hours as needed for Pain. No Known Allergies  History reviewed. No pertinent past medical history. Lab Results   Component Value Date/Time    WBC 5.2 11/20/2019 08:41 AM    HGB 14.7 11/20/2019 08:41 AM    HCT 42.3 11/20/2019 08:41 AM    PLATELET 387 16/59/4440 08:41 AM    MCV 91 11/20/2019 08:41 AM     Lab Results   Component Value Date/Time    Cholesterol, total 125 11/20/2019 08:41 AM    HDL Cholesterol 36 (L) 11/20/2019 08:41 AM    LDL, calculated 71 11/20/2019 08:41 AM    Triglyceride 90 11/20/2019 08:41 AM     Lab Results   Component Value Date/Time    ALT (SGPT) 27 11/20/2019 08:41 AM    Alk.  phosphatase 55 11/20/2019 08:41 AM    Bilirubin, total 1.5 (H) 11/20/2019 08:41 AM    Albumin 4.3 11/20/2019 08:41 AM    Protein, total 6.7 11/20/2019 08:41 AM    PLATELET 438 07/65/1151 08:41 AM     Lab Results   Component Value Date/Time    GFR est non- 11/20/2019 08:41 AM    GFR est  11/20/2019 08:41 AM    Creatinine 0.80 11/20/2019 08:41 AM    BUN 12 11/20/2019 08:41 AM    Sodium 138 11/20/2019 08:41 AM    Potassium 4.4 11/20/2019 08:41 AM    Chloride 102 11/20/2019 08:41 AM    CO2 22 11/20/2019 08:41 AM     Lab Results   Component Value Date/Time    TSH 2.430 11/20/2019 08:41 AM    TSH 3.990 03/22/2018 08:07 AM    T4, Free 1.29 03/22/2018 08:07 AM      Lab Results   Component Value Date/Time    Hemoglobin A1c 4.9 11/20/2019 08:41 AM         Objective:     Visit Vitals  /63 (BP 1 Location: Left arm, BP Patient Position: Sitting)   Pulse 71   Temp 98.8 °F (37.1 °C) (Temporal)   Resp 17   Ht 5' 8\" (1.727 m)   Wt 186 lb 3.2 oz (84.5 kg)   SpO2 99%   BMI 28.31 kg/m²     The patient appears well, alert, oriented x 3, in no distress. ENT normal.  Neck supple. No adenopathy or thyromegaly. CHRISTIANO. Lungs are clear, good air entry, no wheezes, rhonchi or rales. S1 and S2 normal, no murmurs, regular rate and rhythm. Abdomen is soft without tenderness, guarding, mass or organomegaly.  exam: deferred. Extremities show no edema, normal peripheral pulses. Neurological is normal without focal findings. Assessment/Plan:   healthy adult male  lose weight, increase physical activity, follow low fat diet, follow low salt diet, continue present plan, routine labs ordered, call if any problems. ICD-10-CM ICD-9-CM    1. Well adult on routine health check  D75.41 F57.3 METABOLIC PANEL, COMPREHENSIVE      LIPID PANEL      HEMOGLOBIN A1C WITH EAG      CBC WITH AUTOMATED DIFF      THYROID CASCADE PROFILE     Diagnoses and all orders for this visit:    1. Well adult on routine health check  -     METABOLIC PANEL, COMPREHENSIVE; Future  -     LIPID PANEL; Future  -     HEMOGLOBIN A1C WITH EAG; Future  -     CBC WITH AUTOMATED DIFF; Future  -     THYROID CASCADE PROFILE; Future      Follow-up and Dispositions    · Return if symptoms worsen or fail to improve. current treatment plan is effective, no change in therapy  reviewed diet, exercise and weight control.

## 2020-12-07 NOTE — PROGRESS NOTES
Wanda Pacheco is a 47 y.o. male    Chief Complaint   Patient presents with    Complete Physical     not fasting       1. Have you been to the ER, urgent care clinic since your last visit? Hospitalized since your last visit? No    2. Have you seen or consulted any other health care providers outside of the [address] [place] [address] since your last visit? Include any pap smears or colon screening. No    No flowsheet data found.      Health Maintenance Due   Topic Date Due    Pneumococcal 0-64 years (1 of 1 - PPSV23) 11/20/1986

## 2021-01-29 ENCOUNTER — TELEPHONE (OUTPATIENT)
Dept: PRIMARY CARE CLINIC | Age: 41
End: 2021-01-29

## 2021-02-03 ENCOUNTER — VIRTUAL VISIT (OUTPATIENT)
Dept: PRIMARY CARE CLINIC | Age: 41
End: 2021-02-03
Payer: COMMERCIAL

## 2021-02-03 DIAGNOSIS — Z71.6 ENCOUNTER FOR SMOKING CESSATION COUNSELING: Primary | ICD-10-CM

## 2021-02-03 DIAGNOSIS — F32.9 REACTIVE DEPRESSION: ICD-10-CM

## 2021-02-03 PROCEDURE — 99214 OFFICE O/P EST MOD 30 MIN: CPT | Performed by: FAMILY MEDICINE

## 2021-02-03 RX ORDER — BUPROPION HYDROCHLORIDE 150 MG/1
TABLET, EXTENDED RELEASE ORAL
Qty: 60 TAB | Refills: 3 | Status: SHIPPED | OUTPATIENT
Start: 2021-02-03 | End: 2022-07-15

## 2021-02-03 NOTE — PROGRESS NOTES
Chief Complaint   Patient presents with    Nicotine Dependence     No Covid symptoms. 3 most recent PHQ Screens 2/3/2021   Little interest or pleasure in doing things Nearly every day   Feeling down, depressed, irritable, or hopeless Nearly every day   Total Score PHQ 2 6     Abuse Screening Questionnaire 2/3/2021   Do you ever feel afraid of your partner? N   Are you in a relationship with someone who physically or mentally threatens you? N   Is it safe for you to go home? Y     1. Have you been to the ER, urgent care clinic since your last visit? Hospitalized since your last visit?no    2. Have you seen or consulted any other health care providers outside of the [address] [place] [address] since your last visit? Include any pap smears or colon screening.  no

## 2021-02-03 NOTE — PROGRESS NOTES
Natalie Martinez is a 47 y.o. male who was seen by synchronous (real-time) audio-video technology on 2/3/2021 for Nicotine Dependence and Depression (most days)        Assessment & Plan:     Diagnoses and all orders for this visit:    1. Encounter for smoking cessation counseling  -     buPROPion SR (WELLBUTRIN SR) 150 mg SR tablet; Take one tab daily X 3 days and than one tab BID afterwards. Counseling provided. 2. Reactive depression      Same as #1    Follow-up and Dispositions    · Return in about 3 months (around 5/3/2021) for smoking cessation. Josr Galarza 7[address]  Subjective: This is a 46 y/o male is here to consult about smoking cessation. He reports that he has been trying to quit smoking for many years. He was able to quit smoking for a week recently but restarted again . States that he started feeling depressed. He is here to get some help. Prior to Admission medications    Medication Sig Start Date End Date Taking? Authorizing Provider   buPROPion SR [facility] SR) 150 mg SR tablet Take one tab daily X 3 days and than one tab BID afterwards. 2/3/21  Yes Melvin Edmond MD   acetaminophen (TylenoL) 325 mg tablet Take  by mouth every four (4) hours as needed for Pain. Provider, Historical     Patient Active Problem List   Diagnosis Code    Depression F32.9     Current Outpatient Medications   Medication Sig Dispense Refill    buPROPion SR (WELLBUTRIN SR) 150 mg SR tablet Take one tab daily X 3 days and than one tab BID afterwards. 60 Tab 3    acetaminophen (TylenoL) 325 mg tablet Take  by mouth every four (4) hours as needed for Pain. No Known Allergies  History reviewed. No pertinent past medical history. ROS    Objective:   No flowsheet data found.    General: alert, cooperative, no distress   Mental  status: normal mood, behavior, speech, dress, motor activity, and thought processes, able to follow commands   HENT: NCAT   Neck: no visualized mass   Resp: no respiratory distress Neuro: no gross deficits   Skin: no discoloration or lesions of concern on visible areas   Psychiatric: normal affect, consistent with stated mood, no evidence of hallucinations     Additional exam findings: We discussed the expected course, resolution and complications of the diagnosis(es) in detail. Medication risks, benefits, costs, interactions, and alternatives were discussed as indicated. I advised him to contact the office if his condition worsens, changes or fails to improve as anticipated. He expressed understanding with the diagnosis(es) and plan. Acosta Carrizales, who was evaluated through a patient-initiated, synchronous (real-time) audio-video encounter, and/or his healthcare decision maker, is aware that it is a billable service, with coverage as determined by his insurance carrier. He provided verbal consent to proceed: Yes, and patient identification was verified. It was conducted pursuant to the emergency declaration under the [address] [place] [address], [address] [place] [address] authority and the [employer] Act. A caregiver was present when appropriate. Ability to conduct physical exam was limited. I was in the office. The patient was at home.       Mani Reis MD

## 2021-02-17 ENCOUNTER — TELEPHONE (OUTPATIENT)
Dept: PRIMARY CARE CLINIC | Age: 41
End: 2021-02-17

## 2021-02-17 NOTE — TELEPHONE ENCOUNTER
Please call him:    He can take the med at night to see if that makes any difference. He just started taking the med about two weeks ago so I do recommend to continue med unless he absolutely cannot tolerate the side effects.

## 2021-02-17 NOTE — TELEPHONE ENCOUNTER
Pt states his depression medication  is making him dizzy and has loss of concentration. He was told to call if this were to happen and he is not sure what to do next.  He is still currently taking it

## 2021-02-18 ENCOUNTER — VIRTUAL VISIT (OUTPATIENT)
Dept: PRIMARY CARE CLINIC | Age: 41
End: 2021-02-18
Payer: COMMERCIAL

## 2021-02-18 DIAGNOSIS — F32.9 REACTIVE DEPRESSION: Primary | ICD-10-CM

## 2021-02-18 DIAGNOSIS — Z71.6 ENCOUNTER FOR SMOKING CESSATION COUNSELING: ICD-10-CM

## 2021-02-18 PROCEDURE — 99213 OFFICE O/P EST LOW 20 MIN: CPT | Performed by: FAMILY MEDICINE

## 2021-02-18 NOTE — PROGRESS NOTES
Natalie Martinez is a 47 y.o. male who was seen by synchronous (real-time) audio-video technology on 2/18/2021 for No chief complaint on file. Assessment & Plan:     Diagnoses and all orders for this visit:    1. Reactive depression     -  Patient noticed some side effects after taking Wellbutrin BID. He did fine with once/day. Advised that he can continue once/day Wellbutrin if that works better for him. He voiced understanding. 2. Encounter for smoking cessation counseling      Same as #1. Follow-up and Dispositions    · Return in about 2 months (around 4/18/2021), or if symptoms worsen or fail to improve, for smoking cessation. Josr Galarza 712  Subjective: This is a 48 y/o male is here to discuss about side effects noted after he started taking Wellbutrin about two weeks ago for smoking cessation and depression. He reports that when he was taking once daily he did fine- no side effects. When he started taking the med  BID he notice some restless feeling/ lack of concentration  and trouble sleeping at night otherwise he feels fine. He reports that he is not having craving for cigarettes anymore. Prior to Admission medications    Medication Sig Start Date End Date Taking? Authorizing Provider   buPROPion SR [facility] SR) 150 mg SR tablet Take one tab daily X 3 days and than one tab BID afterwards. 2/3/21  Yes Melvin Edmond MD   acetaminophen (TylenoL) 325 mg tablet Take  by mouth every four (4) hours as needed for Pain. Provider, Historical     Patient Active Problem List   Diagnosis Code    Depression F32.9     Current Outpatient Medications   Medication Sig Dispense Refill    buPROPion SR (WELLBUTRIN SR) 150 mg SR tablet Take one tab daily X 3 days and than one tab BID afterwards. 60 Tab 3    acetaminophen (TylenoL) 325 mg tablet Take  by mouth every four (4) hours as needed for Pain. No Known Allergies  No past medical history on file.     ROS    Objective:   No flowsheet data found.   General: alert, cooperative, no distress   Mental  status: normal mood, behavior, speech, dress, motor activity, and thought processes, able to follow commands   HENT: NCAT   Neck: no visualized mass   Resp: no respiratory distress   Neuro: no gross deficits   Skin: no discoloration or lesions of concern on visible areas   Psychiatric: normal affect, consistent with stated mood, no evidence of hallucinations     Additional exam findings:       We discussed the expected course, resolution and complications of the diagnosis(es) in detail.  Medication risks, benefits, costs, interactions, and alternatives were discussed as indicated.  I advised him to contact the office if his condition worsens, changes or fails to improve as anticipated. He expressed understanding with the diagnosis(es) and plan.       Thierry Bateman, who was evaluated through a patient-initiated, synchronous (real-time) audio-video encounter, and/or his healthcare decision maker, is aware that it is a billable service, with coverage as determined by his insurance carrier. He provided verbal consent to proceed: Yes, and patient identification was verified. It was conducted pursuant to the emergency declaration under the [employer] Act and the National Emergencies Act, 1135 waiver authority and the Coronavirus Preparedness and Response Supplemental Appropriations Act. A caregiver was present when appropriate. Ability to conduct physical exam was limited. I was in the office. The patient was at home.      Melvin Edmond MD

## 2021-02-22 ENCOUNTER — TELEPHONE (OUTPATIENT)
Dept: PRIMARY CARE CLINIC | Age: 41
End: 2021-02-22

## 2021-02-22 NOTE — TELEPHONE ENCOUNTER
Called and spoke with patient, he is taking the Wellbutrin once a day and over the past week he has started to feel drossy and unable to concentrate and foggy feeling. Also has been sleepless at times. Wants to know if he should continue current dose or what do you suggest. Appointment for tomorrow was declined earlier when he called.

## 2021-02-22 NOTE — TELEPHONE ENCOUNTER
Since he continue to feel the same symptoms with Wellbutrin I think its okay to  stop taking the med. We can start Zoloft for depression if he agrees. Let me know.

## 2021-02-22 NOTE — TELEPHONE ENCOUNTER
Patient is having drowsiness from medication he is taking, Didn't know the name of the med. Would like a call back.  Wanted to see Dr. Justin Garcia today but since we do not have anything he would like to speak to her over the phone

## 2021-02-23 NOTE — TELEPHONE ENCOUNTER
Called and spoke with the patient and he stated that today he is feeling better so he would like to wait and continue taking the medication the rest of this week and then see how he feels before switching medication.

## 2021-12-09 ENCOUNTER — OFFICE VISIT (OUTPATIENT)
Dept: PRIMARY CARE CLINIC | Age: 41
End: 2021-12-09
Payer: COMMERCIAL

## 2021-12-09 VITALS
HEART RATE: 69 BPM | BODY MASS INDEX: 29.34 KG/M2 | RESPIRATION RATE: 16 BRPM | TEMPERATURE: 98.2 F | DIASTOLIC BLOOD PRESSURE: 44 MMHG | WEIGHT: 193.6 LBS | OXYGEN SATURATION: 100 % | HEIGHT: 68 IN | SYSTOLIC BLOOD PRESSURE: 82 MMHG

## 2021-12-09 DIAGNOSIS — S67.192A CRUSHING INJURY OF RIGHT MIDDLE FINGER, INITIAL ENCOUNTER: Primary | ICD-10-CM

## 2021-12-09 PROCEDURE — 99213 OFFICE O/P EST LOW 20 MIN: CPT | Performed by: FAMILY MEDICINE

## 2021-12-09 NOTE — PROGRESS NOTES
Identified pt with two pt identifiers(name and ). Chief Complaint   Patient presents with   [facility] Follow Up     broken middle finger on R hand - happened in october         3 most recent PHQ Screens 2/3/2021   Little interest or pleasure in doing things Nearly every day   Feeling down, depressed, irritable, or hopeless Nearly every day   Total Score PHQ 2 6        Vitals:    21 1017 21 1020   BP: (!) 91/53 (!) 82/44   Pulse: 69    Resp: 16    Temp: 98.2 °F (36.8 °C)    TempSrc: Temporal    SpO2: 100%    Weight: 193 lb 9.6 oz (87.8 kg)    Height: 5' 8\" (1.727 m)        Health Maintenance Due   Topic    Hepatitis C Screening     Pneumococcal 0-64 years (1 of 2 - PPSV23)    Flu Vaccine (1)    COVID-19 Vaccine (3 - Booster for Moderna series)       1. Have you been to the ER, urgent care clinic since your last visit? Hospitalized since your last visit? YES -ED (not sure where ) 10/2021 for broken fnger    2. Have you seen or consulted any other health care providers outside of the [address] [place] [address] since your last visit? Include any pap smears or colon screening.  No

## 2021-12-09 NOTE — PROGRESS NOTES
Subjective:     Chief Complaint   Patient presents with   [facility] Follow Up     broken middle finger on R hand - happened in october         He  is a 46y.o. year old male who presents for evaluation of right middle finger injury. He reports that he injured the distal phalanx of the right middle finger by dumbbell when he was doing exercise on 10/29/2021. Went to [address] [place] [address] where he had Xray done and showed fracture. Reports that after that [place] of [place] so he could not follow up with orthopedics. Reports that he is  able to move his finger but its TTP. He would like to see a orthopedics. Denies any chest pain, soa, cough, abdominal pain. Pertinent items are noted in HPI. Objective:     Vitals:    12/09/21 1017 12/09/21 1020   BP: (!) 91/53 (!) 82/44   Pulse: 69    Resp: 16    Temp: 98.2 °F (36.8 °C)    TempSrc: Temporal    SpO2: 100%    Weight: 193 lb 9.6 oz (87.8 kg)    Height: 5' 8\" (1.727 m)        Physical Examination: General appearance - alert, well appearing, and in no distress, oriented to person, place, and time and overweight  Mental status - alert, oriented to person, place, and time, normal mood, behavior, speech, dress, motor activity, and thought processes  Extremities - Mild swelling of the distal phalanx , TTP. No Known Allergies   Social History     Socioeconomic History    Marital status:    Tobacco Use    Smoking status: Current Every Day Smoker    Smokeless tobacco: Never Used    Tobacco comment: 6-7 cigs a day   Vaping Use    Vaping Use: Never used   Substance and Sexual Activity    Alcohol use: No    Drug use: No    Sexual activity: Yes     Partners: Female      Family History   Problem Relation Age of Onset    No Known Problems Mother     No Known Problems Father       History reviewed. No pertinent surgical history. History reviewed. No pertinent past medical history.    Current Outpatient Medications   Medication Sig Dispense Refill    buPROPion SR (WELLBUTRIN SR) 150 mg SR tablet Take one tab daily X 3 days and than one tab BID afterwards. (Patient not taking: Reported on 12/9/2021) 60 Tab 3    acetaminophen (TylenoL) 325 mg tablet Take  by mouth every four (4) hours as needed for Pain. (Patient not taking: Reported on 12/9/2021)          Assessment/ Plan:   Diagnoses and all orders for this visit:    1. Crushing injury of right middle finger, initial encounter  -     REFERRAL TO ORTHOPEDICS           Medication risks/benefits/costs/interactions/alternatives discussed with patient. Advised patient to call back or return to office if symptoms worsen/change/persist. If patient cannot reach us or should anything more severe/urgent arise he/she should proceed directly to the nearest emergency department. Discussed expected course/resolution/complications of diagnosis in detail with patient. Patient given a written after visit summary which includes her diagnoses, current medications and vitals. Patient expressed understanding with the diagnosis and plan. Follow-up and Dispositions    · Return if symptoms worsen or fail to improve.

## 2022-03-19 PROBLEM — F32.A DEPRESSION: Status: ACTIVE | Noted: 2018-02-06

## 2022-07-15 ENCOUNTER — HOSPITAL ENCOUNTER (OUTPATIENT)
Dept: GENERAL RADIOLOGY | Age: 42
Discharge: HOME | End: 2022-07-15
Payer: COMMERCIAL

## 2022-07-15 ENCOUNTER — OFFICE VISIT (OUTPATIENT)
Dept: PRIMARY CARE CLINIC | Age: 42
End: 2022-07-15
Payer: COMMERCIAL

## 2022-07-15 VITALS
WEIGHT: 191.8 LBS | DIASTOLIC BLOOD PRESSURE: 69 MMHG | OXYGEN SATURATION: 99 % | HEART RATE: 58 BPM | TEMPERATURE: 97.1 F | RESPIRATION RATE: 12 BRPM | BODY MASS INDEX: 29.07 KG/M2 | SYSTOLIC BLOOD PRESSURE: 112 MMHG | HEIGHT: 68 IN

## 2022-07-15 DIAGNOSIS — M54.50 ACUTE BILATERAL LOW BACK PAIN WITHOUT SCIATICA: ICD-10-CM

## 2022-07-15 DIAGNOSIS — F32.9 REACTIVE DEPRESSION: Primary | ICD-10-CM

## 2022-07-15 DIAGNOSIS — Z83.3 FAMILY HISTORY OF DIABETES MELLITUS: ICD-10-CM

## 2022-07-15 DIAGNOSIS — Z83.438 FAMILY HISTORY OF HYPERLIPIDEMIA: ICD-10-CM

## 2022-07-15 DIAGNOSIS — Z11.59 ENCOUNTER FOR HEPATITIS C SCREENING TEST FOR LOW RISK PATIENT: ICD-10-CM

## 2022-07-15 DIAGNOSIS — Z01.89 ROUTINE LAB DRAW: ICD-10-CM

## 2022-07-15 DIAGNOSIS — Z87.891 EX-CIGARETTE SMOKER: ICD-10-CM

## 2022-07-15 DIAGNOSIS — S67.192D CRUSHING INJURY OF RIGHT MIDDLE FINGER, SUBSEQUENT ENCOUNTER: ICD-10-CM

## 2022-07-15 PROCEDURE — 99204 OFFICE O/P NEW MOD 45 MIN: CPT | Performed by: NURSE PRACTITIONER

## 2022-07-15 PROCEDURE — 72100 X-RAY EXAM L-S SPINE 2/3 VWS: CPT

## 2022-07-15 RX ORDER — BACLOFEN 10 MG/1
10 TABLET ORAL
Qty: 30 TABLET | Refills: 0 | Status: SHIPPED | OUTPATIENT
Start: 2022-07-15 | End: 2022-07-25

## 2022-07-15 RX ORDER — NAPROXEN 500 MG/1
500 TABLET ORAL
Qty: 20 TABLET | Refills: 0 | Status: SHIPPED | OUTPATIENT
Start: 2022-07-15 | End: 2022-07-25

## 2022-07-15 NOTE — PROGRESS NOTES
[facility]   [address], [address] [facility]  P: [phone]  F: [phone]    SUBJECTIVE     HPI:     Maria Antonia Don is a 46 y.o. male who is seen in the clinic for   Chief Complaint   Patient presents with   Abbey Perez Establish Care    Back Pain     started 10 days ago- no injuries- pt states that he sits alot while working and that may the reason for pain- pt has not taken any meds otc-      The patient presents today to establish care and w/ complaints of lower back pain that began 10 days prior. Pain is worse when sitting. He has not tried any medications for pain. He denies any trauma/falls and/or other triggers. Denies any urinary/GI symptoms. He saw Ortho in December for a fx of right 3rd finger, where a splint was placed. Denies any pain in the finger and has full ROM. He quit smoking 5 months ago and has thus stopped taking Wellbutrin. Denies any Depression/SI. He would like routine labs drawn. Has a family history of DM II/HLD. Patient Active Problem List    Diagnosis    Depression        History reviewed. No pertinent past medical history. History reviewed. No pertinent surgical history.   Social History     Socioeconomic History    Marital status:      Spouse name: Not on file    Number of children: Not on file    Years of education: Not on file    Highest education level: Not on file   Occupational History    Not on file   Tobacco Use    Smoking status: Former Smoker     Quit date: 2022     Years since quittin.4    Smokeless tobacco: Never Used    Tobacco comment: 6-7 cigs a day   Vaping Use    Vaping Use: Never used   Substance and Sexual Activity    Alcohol use: No    Drug use: No    Sexual activity: Yes     Partners: Female   Other Topics Concern    Not on file   Social History Narrative    Not on file     Social Determinants of Health     Financial Resource Strain:     Difficulty of Paying Living Expenses: Not on file   Food Insecurity:     Worried About Running Out of Food in the Last Year: Not on file    Nestor of Food in the Last Year: Not on file   Transportation Needs:     Lack of Transportation (Medical): Not on file    Lack of Transportation (Non-Medical): Not on file   Physical Activity:     Days of Exercise per Week: Not on file    Minutes of Exercise per Session: Not on file   Stress:     Feeling of Stress : Not on file   Social Connections:     Frequency of Communication with Friends and Family: Not on file    Frequency of Social Gatherings with Friends and Family: Not on file    Attends Judaism Services: Not on file    Active Member of [address] [place] [address] [employer] or Organizations: Not on file    Attends Club or Organization Meetings: Not on file    Marital Status: Not on file   Intimate Partner Violence:     Fear of Current or Ex-Partner: Not on file    Emotionally Abused: Not on file    Physically Abused: Not on file    Sexually Abused: Not on file   Housing Stability:     Unable to Pay for Housing in the Last Year: Not on file    Number of [place] in the Last Year: Not on file    Unstable Housing in the Last Year: Not on file     Family History   Problem Relation Age of Onset    No Known Problems Mother     No Known Problems Father      Immunization History   Administered Date(s) Administered    COVID-19, MODERNA [place] border, Primary or Immunocompromised, (age 18y+), IM, 100 mcg/0.5mL 04/30/2021, 05/28/2021    COVID-19, MODERNA Booster BLUE border, (age 18y+), IM, 50mcg/0.25mL 12/27/2021    Influenza Vaccine 09/16/2017    Influenza Vaccine (Quad) PF (>6 Mo Flulaval, Fluarix, and >3 Yrs [address] [place] [address], [facility] [id number]) 09/26/2018, 10/31/2019, 10/29/2020    MMR 09/16/2017, 12/21/2017    Poliovirus vaccine 09/16/2017    Td 12/21/2017    Tdap 09/16/2017      No Known Allergies    No visits with results within 3 Month(s) from this visit.    Latest known visit with results is:   Orders Only on 12/15/2020   Component Date Value Ref Range Status    Sodium 12/15/2020 138  136 - 145 mmol/L Final    Potassium 12/15/2020 4.2  3.5 - 5.1 mmol/L Final    Chloride 12/15/2020 107  97 - 108 mmol/L Final    CO2 12/15/2020 27  21 - 32 mmol/L Final    Anion gap 12/15/2020 4* 5 - 15 mmol/L Final    Glucose 12/15/2020 96  65 - 100 mg/dL Final    BUN 12/15/2020 16  6 - 20 MG/DL Final    Creatinine 12/15/2020 0.91  0.70 - 1.30 MG/DL Final    BUN/Creatinine ratio 12/15/2020 18  12 - 20   Final    GFR est AA 12/15/2020 >60  >60 ml/min/1.73m2 Final    GFR est non-AA 12/15/2020 >60  >60 ml/min/1.73m2 Final    Comment: Estimated GFR is calculated using the IDMS-traceable Modification of Diet in  Renal Disease (MDRD) Study equation, reported for both  Americans  (GFRAA) and non- Americans (GFRNA), and normalized to 1.73m2 body  surface area. The physician must decide which value applies to the patient.  Calcium 12/15/2020 9.4  8.5 - 10.1 MG/DL Final    Bilirubin, total 12/15/2020 0.9  0.2 - 1.0 MG/DL Final    ALT (SGPT) 12/15/2020 32  12 - 78 U/L Final    AST (SGOT) 12/15/2020 20  15 - 37 U/L Final    Alk. phosphatase 12/15/2020 64  45 - 117 U/L Final    Protein, total 12/15/2020 7.3  6.4 - 8.2 g/dL Final    Albumin 12/15/2020 4.1  3.5 - 5.0 g/dL Final    Globulin 12/15/2020 3.2  2.0 - 4.0 g/dL Final    A-G Ratio 12/15/2020 1.3  1.1 - 2.2   Final    LIPID PROFILE 12/15/2020        Final    Cholesterol, total 12/15/2020 137  <200 MG/DL Final    Triglyceride 12/15/2020 146  <150 MG/DL Final    Comment: Based on NCEP-ATP III:  Triglycerides <150 mg/dL  is considered normal, 150-199  mg/dL  borderline high,  200-499 mg/dL high and  greater than or equal to 500  mg/dL very high.  HDL Cholesterol 12/15/2020 47  MG/DL Final    Comment: Based on NCEP ATP III, HDL Cholesterol <40 mg/dL is considered low and >60  mg/dL is elevated.       LDL, calculated 12/15/2020 60.8  0 - 100 MG/DL Final    Comment: Based on the NCEP-ATP: LDL-C concentrations are considered  optimal <100 mg/dL,  near optimal/above Normal 100-129 mg/dL Borderline High: 130-159, High: 160-189  mg/dL Very High: Greater than or equal to 190 mg/dL      VLDL, calculated 12/15/2020 29.2  MG/DL Final    CHOL/HDL Ratio 12/15/2020 2.9  0.0 - 5.0   Final    Hemoglobin A1c 12/15/2020 4.9  4.0 - 5.6 % Final    Comment: NEW METHOD PLEASE NOTE NEW REFERENCE RANGE  (NOTE)  HbA1C Interpretive Ranges  <5.7              Normal  5.7 - 6.4         Consider Prediabetes  >6.5              Consider Diabetes      Est. average glucose 12/15/2020 94  mg/dL Final    WBC 12/15/2020 5.1  4.1 - 11.1 K/uL Final    RBC 12/15/2020 4.85  4.10 - 5.70 M/uL Final    HGB 12/15/2020 15.0  12.1 - 17.0 g/dL Final    HCT 12/15/2020 44.5  36.6 - 50.3 % Final    MCV 12/15/2020 91.8  80.0 - 99.0 FL Final    MCH 12/15/2020 30.9  26.0 - 34.0 PG Final    MCHC 12/15/2020 33.7  30.0 - 36.5 g/dL Final    RDW 12/15/2020 12.3  11.5 - 14.5 % Final    PLATELET 63/76/9498 851  150 - 400 K/uL Final    MPV 12/15/2020 11.7  8.9 - 12.9 FL Final    NRBC 12/15/2020 0.0  0  WBC Final    ABSOLUTE NRBC 12/15/2020 0.00  0.00 - 0.01 K/uL Final    NEUTROPHILS 12/15/2020 46  32 - 75 % Final    LYMPHOCYTES 12/15/2020 38  12 - 49 % Final    MONOCYTES 12/15/2020 10  5 - 13 % Final    EOSINOPHILS 12/15/2020 6  0 - 7 % Final    BASOPHILS 12/15/2020 0  0 - 1 % Final    IMMATURE GRANULOCYTES 12/15/2020 0  0.0 - 0.5 % Final    ABS. NEUTROPHILS 12/15/2020 2.3  1.8 - 8.0 K/UL Final    ABS. LYMPHOCYTES 12/15/2020 2.0  0.8 - 3.5 K/UL Final    ABS. MONOCYTES 12/15/2020 0.5  0.0 - 1.0 K/UL Final    ABS. EOSINOPHILS 12/15/2020 0.3  0.0 - 0.4 K/UL Final    ABS. BASOPHILS 12/15/2020 0.0  0.0 - 0.1 K/UL Final    ABS. IMM. GRANS. 12/15/2020 0.0  0.00 - 0.04 K/UL Final    DF 12/15/2020 AUTOMATED    Final    TSH 12/15/2020 3.240  0.450 - 4.500 uIU/mL Final    Comment: (NOTE)  No apparent thyroid disorder. Additional testing not indicated. In  rare instances, Secondary Hypothyroidism as well as Subclinical  Hypothyroidism have been reported in some patients with normal TSH  values. Performed At: [address] [id number]  Moy Abarca MD WP:[id number]        XR ABD (KUB)  Narrative: EXAM:  XR ABD (KUB)    INDICATION:  left flank pain    COMPARISON: None. FINDINGS: A supine radiograph of the abdomen shows a normal bowel gas pattern. There are few small calcifications in the left pelvis nonspecific but could be  phleboliths. . The bones and soft tissues are within normal limits. Impression: IMPRESSION: Gas pattern is normal. There are few calcifications left pelvis  nonspecific but could be phleboliths although distal calculus is not entirely  excluded. Current Outpatient Medications   Medication Sig Dispense Refill    baclofen (LIORESAL) 10 mg tablet Take 1 Tablet by mouth three (3) times daily as needed for Muscle Spasm(s) for up to 10 days. 30 Tablet 0    naproxen (NAPROSYN) 500 mg tablet Take 1 Tablet by mouth two (2) times daily as needed for Pain for up to 10 days. 20 Tablet 0           The past medical history, past surgical history, and family history were reviewed and updated in the medical record. Lab values/Imaging were reviewed. The medications were reviewed and updated in the medical record. Immunizations were reviewed and updated in the medical record. All relevant preventative screenings reviewed and updated in the medical record. REVIEW OF SYSTEMS   Review of Systems   Constitutional: Negative for chills, diaphoresis, fever and malaise/fatigue. Gastrointestinal: Negative for abdominal pain, constipation and diarrhea. Genitourinary: Negative for dysuria, flank pain, frequency, hematuria and urgency. Musculoskeletal: Positive for back pain and joint pain. Negative for falls and neck pain.    Psychiatric/Behavioral: Negative for depression and suicidal ideas. The patient is not nervous/anxious. PHYSICAL EXAM   /69 (BP 1 Location: Right arm, BP Patient Position: Sitting, BP Cuff Size: Adult)   Pulse (!) 58   Temp 97.1 °F (36.2 °C) (Temporal)   Resp 12   Ht 5' 8\" (1.727 m)   Wt 191 lb 12.8 oz (87 kg)   SpO2 99%   BMI 29.16 kg/m²      Physical Exam  Constitutional:       General: He is not in acute distress. Appearance: He is not ill-appearing or diaphoretic. Cardiovascular:      Rate and Rhythm: Regular rhythm. Bradycardia present. Pulses: Normal pulses. Heart sounds: Normal heart sounds. Pulmonary:      Effort: Pulmonary effort is normal. No respiratory distress. Breath sounds: Normal breath sounds. No wheezing. Musculoskeletal:         General: Tenderness present. No swelling or deformity. Skin:     Capillary Refill: Capillary refill takes less than 2 seconds. Neurological:      General: No focal deficit present. Mental Status: He is alert. Cranial Nerves: No cranial nerve deficit. Motor: No weakness. ASSESSMENT/ PLAN   Below is the assessment and plan developed based on review of pertinent history, physical exam, labs, studies, and medications. 1. Reactive depression  Has since resolved. Will continue to monitor in the future. 2. Acute bilateral low back pain without sciatica  Advised patient to only call Spine Surgery if x-ray is abnormal.   -     XR SPINE LUMB 2 OR 3 V; Future  -     REFERRAL TO PHYSICAL THERAPY  -     REFERRAL TO ORTHOPEDICS  -     baclofen (LIORESAL) 10 mg tablet; Take 1 Tablet by mouth three (3) times daily as needed for Muscle Spasm(s) for up to 10 days. , Normal, Disp-30 Tablet, R-0  -     naproxen (NAPROSYN) 500 mg tablet; Take 1 Tablet by mouth two (2) times daily as needed for Pain for up to 10 days. , Normal, Disp-20 Tablet, R-0  3. Crushing injury of right middle finger, subsequent encounter  Has since resolved with interventions from Ortho.  Will continue to monitor in the future. 4. Ex-cigarette smoker  5. Family history of hyperlipidemia  -     LIPID PANEL; Future  6. Family history of diabetes mellitus  -     METABOLIC PANEL, COMPREHENSIVE; Future  7. Routine lab draw  -     CBC WITH AUTOMATED DIFF; Future  -     METABOLIC PANEL, COMPREHENSIVE; Future  8. Encounter for hepatitis C screening test for low risk patient  -     HEPATITIS C AB; Future           Follow-up and Dispositions    · Return for F/U after 3-4 sessions w/ PT. Disclaimer:  Advised patient to call back or return to office if symptoms worsen/change/persist.  Discussed expected course/resolution/complications of diagnosis in detail with patient. Medication risks/benefits/alternatives discussed with patient. Patient was given an after visit summary which includes diagnoses, current medications, & vitals. Discussed patient instructions and advised to read to all patient instructions regarding care. Patient expressed understanding with the diagnosis and plan.        Adam Ho NP  7/15/2022

## 2022-07-15 NOTE — PROGRESS NOTES
Chief Complaint   Patient presents with    Establish Care    Back Pain     started 10 days ago- no injuries- pt states that he sits alot while working and that may the reason for pain- pt has not taken any meds otc-       Health Maintenance Due   Topic    Hepatitis C Screening     Pneumococcal 0-64 years (1 - PCV)    Depression Monitoring         1. Have you been to the ER, urgent care clinic since your last visit? Hospitalized since your last visit? No    2. Have you seen or consulted any other health care providers outside of the [address] [place] [address] since your last visit? Include any pap smears or colon screening.  No    Visit Vitals  /69 (BP 1 Location: Right arm, BP Patient Position: Sitting, BP Cuff Size: Adult)   Pulse (!) 58   Temp 97.1 °F (36.2 °C) (Temporal)   Resp 12   Ht 5' 8\" (1.727 m)   Wt 191 lb 12.8 oz (87 kg)   SpO2 99%   BMI 29.16 kg/m²

## 2022-07-15 NOTE — PROGRESS NOTES
X-ray shows Lumbosacral Facet Arthropathy and a sacral/coccyx fracture. Please make sure that the patient follows-up with Spine and PT.

## 2022-07-20 ENCOUNTER — TELEPHONE (OUTPATIENT)
Dept: PRIMARY CARE CLINIC | Age: 42
End: 2022-07-20

## 2022-07-20 NOTE — TELEPHONE ENCOUNTER
Called Patient and went over lab results     Patient stated he also wanted a Letter sent out to him to address listed in Chart

## 2022-07-28 ENCOUNTER — HOSPITAL ENCOUNTER (OUTPATIENT)
Dept: PHYSICAL THERAPY | Age: 42
Discharge: HOME | End: 2022-07-28
Payer: COMMERCIAL

## 2022-07-28 PROCEDURE — 97110 THERAPEUTIC EXERCISES: CPT | Performed by: PHYSICAL THERAPY ASSISTANT

## 2022-07-28 PROCEDURE — 97161 PT EVAL LOW COMPLEX 20 MIN: CPT | Performed by: PHYSICAL THERAPY ASSISTANT

## 2022-07-28 NOTE — PROGRESS NOTES
Physical Therapy at [facility],   a part of [facility]  [address]  [place], [address]  Phone: [phone]  Fax: [phone]    Plan of Care/Statement of Necessity for Physical Therapy Services  2-15    Patient name: Gilbert Handy  : 1980  Provider#: [id number]  Referral source: Boris Rhoades NP      Medical/Treatment Diagnosis: Low back pain, unspecified [M54.50]     Prior Hospitalization: see medical history     Comorbidities: see chart  Prior Level of Function: see chart  Medications: Verified on Patient Summary List    Start of Care: 22      Onset Date: 22-25 days ago       The Plan of Care and following information is based on the information from the initial evaluation. Assessment/ key information: Pt has signs and symptoms of facet arthropathy and potential sacral coccyx fracture that affects his ability to perform ADL's, function, and sit for work. Pt is a good candidate for therapy was educated on followup w/ MD about fracture.     Evaluation Complexity History LOW Complexity : Zero comorbidities / personal factors that will impact the outcome / POC; Examination LOW Complexity : 1-2 Standardized tests and measures addressing body structure, function, activity limitation and / or participation in recreation  ;Presentation LOW Complexity : Stable, uncomplicated  ;Clinical Decision Making LOW Complexity : FOTO score of   Overall Complexity Rating: LOW     Problem List: pain affecting function, decrease ROM, decrease strength, edema affecting function, decrease ADL/ functional abilitiies, decrease activity tolerance, and decrease flexibility/ joint mobility   Treatment Plan may include any combination of the following: Therapeutic exercise, Therapeutic activities, Neuromuscular re-education, Physical agent/modality, Gait/balance training, Manual therapy, and Patient education  Patient / Family readiness to learn indicated by: asking questions  Persons(s) to be included in education: patient (P)  Barriers to Learning/Limitations: None  Patient Goal (s): decrease pain  Patient Self Reported Health Status: good  Rehabilitation Potential: good    Short Term Goals: To be accomplished in 2 weeks:  Pt will be I w/ HEP  Pt will apply heat to low back at least 2 x day  Pt will sit w/ lumbar support 100% of the time while working  Long Term Goals: To be accomplished in 6 weeks:  Pt will report 0/10 pain w/ work activities  Pt will be able to stand for 30 minutes w/o increase in pain  Frequency / Duration: Patient to be seen 1-2 times per week for 6 weeks. Patient/ Caregiver education and instruction: self care, activity modification, and exercises    [x]  Plan of care has been reviewed with HAILE Cummings DPT, [facility] 7/28/2022   ________________________________________________________________________    I certify that the above Therapy Services are being furnished while the patient is under my care. I agree with the treatment plan and certify that this therapy is necessary.     Physician's Signature:____________________  Date:____________Time: _________      Lesly Jung NP

## 2022-07-28 NOTE — PROGRESS NOTES
PT INITIAL EVALUATION NOTE - [facility] 2-15    Patient Name: Martha Caba  Date:2022  : 1980  [x]  Patient  Verified  Payor: [employer] / Plan: [place] [employer] / Product Type: PPO /    In time: 12:30 pm  Out time: 1:10 pm  Total Treatment Time (min): 40  Total Timed Codes (min): 10  1:1 Treatment Time ( only): 10   Visit #: 1     Treatment Area: Low back pain, unspecified [M54.50]    SUBJECTIVE  Pain Level (0-10 scale): 3  Any medication changes, allergies to medications, adverse drug reactions, diagnosis change, or new procedure performed?: [] No    [x] Yes (see summary sheet for update)  Subjective:    Pt complains of L sided low back pain that started about 22-25 days ago w/ insidious onset. Pt states he went to his PCP and x-rays were taken but he did not know the outcome of x-rays and was told to start therapy. Pt has pain in his back when sitting for too long and when standing for too long. Pt recently started using small pillow behind his back that has really helped decrease his pain. Pt works out at gym 3 days per week.   PLOF: working out at [employer] of Injury: unknown  Previous Treatment/Compliance: good  PMHx/Surgical Hx: see chart  Work Hx: desk job  Living Situation: not asked  Pt Goals: \"less pain\"  Barriers: none  Motivation: good  Substance use: none  Cognition: A & O x 4        OBJECTIVE/EXAMINATION  Posture:  slouched in chair  Other Observations:  n/a  Gait and Functional Mobility:  normal  Palpation: TTP over L SI and L5 paraspinals    Lumbar ROM: full w/o pain  L hip ROM: full    LOWER QUARTER   MUSCLE STRENGTH  KEY       R  L  0 - No Contraction  L1, L2 Psoas  5  5  1 - Trace   L3 Quads  5  5  2 - Poor   L4 Tib Ant  5  5  3 - Fair    L5 EHL  5  5  4 - Good   S1 FHL  5  5  5 - Normal   S2 Hams  5  5    MMT: no pain  Neurological: Reflexes / Sensations: normal  Special Tests: - slr, - slump, - point tenderness over sacrum and coccyx       Modality rationale: decrease pain, increase tissue extensibility, and increase muscle contraction/control to improve the patients ability to ambulate   Min Type Additional Details    [] Estim: []Att   []Unatt        []TENS instruct                  []IFC  []Premod   []NMES                     []Other:  []w/US   []w/ice   []w/heat  Position:  Location:    []  Traction: [] Cervical       []Lumbar                       [] Prone          []Supine                       []Intermittent   []Continuous Lbs:  [] before manual  [] after manual  []w/heat    []  Ultrasound: []Continuous   [] Pulsed at:                           []1MHz   []3MHz Location:  W/cm2:    [] Paraffin         Location:   []w/heat   10 []  Ice     [x]  Heat  []  Ice massage Position: supine  Location: low back    []  Laser  []  Other: Position:  Location:      []  Vasopneumatic Device Pressure:       [] lo [] med [] hi   Temperature:      [x] Skin assessment post-treatment:  [x]intact []redness- no adverse reaction    []redness - adverse reaction:     10 min Therapeutic Exercise:  [x] See flow sheet :   Rationale: increase ROM and increase strength to improve the patients ability to ambulate          With   [x] TE   [] TA   [] Neuro   [] SC   [] other: Patient Education: [x] Review HEP    [] Progressed/Changed HEP based on:   [] positioning   [] body mechanics   [] transfers   [] heat/ice application    [x] other: tissues at fault, heat, follow up w/ MD, possible fracture, posture, workplace modifications      Other Objective/Functional Measures: NT    Pain Level (0-10 scale) post treatment: 1    ASSESSMENT/Changes in Function:     [x]  See Plan of [address] Barba, BREANN 7/28/2022

## 2022-08-19 ENCOUNTER — HOSPITAL ENCOUNTER (OUTPATIENT)
Dept: PHYSICAL THERAPY | Age: 42
Discharge: HOME | End: 2022-08-19
Payer: COMMERCIAL

## 2022-08-19 PROCEDURE — 97140 MANUAL THERAPY 1/> REGIONS: CPT | Performed by: PHYSICAL THERAPY ASSISTANT

## 2022-08-19 PROCEDURE — 97110 THERAPEUTIC EXERCISES: CPT | Performed by: PHYSICAL THERAPY ASSISTANT

## 2022-08-19 NOTE — PROGRESS NOTES
PT DAILY TREATMENT NOTE - [facility] 2-15    Patient Name: Alex Forth  Date:2022  : 1980  [x]  Patient  Verified  Payor: [facility] / Plan: [place] [employer] / Product Type: PPO /    In time: 7:00 am  Out time: 7:50  Total Treatment Time (min): 50  Total Timed Codes (min): 40  1:1 Treatment Time (MC only): 40   Visit #:  2    Treatment Area: Low back pain, unspecified [M54.50]    SUBJECTIVE  Pain Level (0-10 scale): 0  Any medication changes, allergies to medications, adverse drug reactions, diagnosis change, or new procedure performed?: [x] No    [] Yes (see summary sheet for update)  Subjective functional status/changes:   [] No changes reported  Pt states he is doing well today and his back is feeling much better.     OBJECTIVE    Modality rationale: decrease pain, increase tissue extensibility, and increase muscle contraction/control to improve the patients ability to perform ADL's   Min Type Additional Details       [] Estim: []Att   []Unatt    []TENS instruct                  []IFC  []Premod   []NMES                     []Other:  []w/US   []w/ice   []w/heat  Position:  Location:       []  Traction: [] Cervical       []Lumbar                       [] Prone          []Supine                       []Intermittent   []Continuous Lbs:  [] before manual  [] after manual  []w/heat    []  Ultrasound: []Continuous   [] Pulsed                       at: []1MHz   []3MHz Location:  W/cm2:    [] Paraffin         Location:   []w/heat   10 []  Ice     [x]  Heat  []  Ice massage Position: supine  Location: low back    []  Laser  []  Other: Position:  Location:      []  Vasopneumatic Device Pressure:       [] lo [] med [] hi   Temperature:      [x] Skin assessment post-treatment:  [x]intact []redness- no adverse reaction    []redness - adverse reaction:     25 min Therapeutic Exercise:  [x] See flow sheet :   Rationale: increase ROM, increase strength, and improve coordination to improve the patients ability to perform ADL's    15 min Manual Therapy: passive hamstrings stretch, hip ER stretch, STM to lumbar paraspinals bilaterally    Rationale: decrease pain, increase ROM, and increase tissue extensibility to improve the patients ability to perform ADL's            With   [] TE   [] TA   [] Neuro   [] SC   [] other: Patient Education: [x] Review HEP    [] Progressed/Changed HEP based on:   [] positioning   [] body mechanics   [] transfers   [] heat/ice application    [] other:      Other Objective/Functional Measures: NT     Pain Level (0-10 scale) post treatment: 0    ASSESSMENT/Changes in Function:   Pt tolerated there-ex well and demonstrates much improved ability to transfer. Patient will continue to benefit from skilled PT services to modify and progress therapeutic interventions, address functional mobility deficits, address ROM deficits, address strength deficits, analyze and address soft tissue restrictions, and analyze and cue movement patterns to attain remaining goals.      []  See Plan of Care  []  See progress note/recertification  []  See Discharge Summary         Progress towards goals / Updated goals:  NT    PLAN  [x]  Upgrade activities as tolerated     [x]  Continue plan of care  []  Update interventions per flow sheet       []  Discharge due to:_  []  Other:_      Disha Stovall, DPT, OCS 8/19/2022

## 2023-02-06 ENCOUNTER — OFFICE VISIT (OUTPATIENT)
Dept: PRIMARY CARE CLINIC | Age: 43
End: 2023-02-06
Payer: COMMERCIAL

## 2023-02-06 ENCOUNTER — NURSE TRIAGE (OUTPATIENT)
Dept: OTHER | Facility: CLINIC | Age: 43
End: 2023-02-06

## 2023-02-06 VITALS
DIASTOLIC BLOOD PRESSURE: 71 MMHG | OXYGEN SATURATION: 99 % | SYSTOLIC BLOOD PRESSURE: 120 MMHG | RESPIRATION RATE: 16 BRPM | BODY MASS INDEX: 29.55 KG/M2 | HEIGHT: 68 IN | HEART RATE: 71 BPM | TEMPERATURE: 98.4 F | WEIGHT: 195 LBS

## 2023-02-06 DIAGNOSIS — E78.1 HYPERTRIGLYCERIDEMIA: ICD-10-CM

## 2023-02-06 DIAGNOSIS — R00.2 PALPITATIONS: ICD-10-CM

## 2023-02-06 DIAGNOSIS — M47.817 FACET ARTHRITIS OF LUMBOSACRAL REGION: Primary | ICD-10-CM

## 2023-02-06 DIAGNOSIS — M95.9 DEFORMITY OF BONE: ICD-10-CM

## 2023-02-06 DIAGNOSIS — Z78.9 EXCESSIVE CAFFEINE INTAKE: ICD-10-CM

## 2023-02-06 DIAGNOSIS — F17.200 CURRENT SMOKER: ICD-10-CM

## 2023-02-06 PROCEDURE — 99214 OFFICE O/P EST MOD 30 MIN: CPT | Performed by: NURSE PRACTITIONER

## 2023-02-06 PROCEDURE — 93000 ELECTROCARDIOGRAM COMPLETE: CPT | Performed by: NURSE PRACTITIONER

## 2023-02-06 NOTE — PROGRESS NOTES
Chief Complaint   Patient presents with    Palpitations     With exercise      Visit Vitals  /71 (BP 1 Location: Left upper arm, BP Patient Position: Sitting, BP Cuff Size: Small adult)   Pulse 71   Temp 98.4 °F (36.9 °C)   Resp 16   Ht 5' 8\" (1.727 m)   Wt 195 lb (88.5 kg)   SpO2 99%   BMI 29.65 kg/m²

## 2023-02-06 NOTE — PROGRESS NOTES
[facility]   [facility] [address], [address] [facility]  P: [phone]  F: [phone]    SUBJECTIVE     HPI:   Roel Connelly is a 38 y.o. male who is seen in the clinic for   Chief Complaint   Patient presents with    Palpitations     With exercise       The patient presents today complaints of intermittent palpitations. Palpitations occur during exercise. Last 5-10 seconds in duration. Denies any other associated s/s. Smokes 8-9 cigarettes/day. Drinks 3-4 cups of coffee per day. AMB POC EKG today shows NSR. On 7/15, Lumbosacral X-ray was performed. X-ray showed the following: Alignment is anatomic. Vertebral body and disc space heights are maintained. Degenerative endplate osteophytes at L3-L4. Lower lumbosacral facet arthropathy. Age-indeterminate fracture deformity of the lower sacrum/upper coccyx, correlate with point tenderness. Referral to Ortho/Spine and PT was placed. Went to PT. Symptoms have since improved. On 7/15, routine labs were drawn. Abnormal's are noted below. Triglycerides were 202. Patient Active Problem List    Diagnosis    Depression        History reviewed. No pertinent past medical history. History reviewed. No pertinent surgical history.   Social History     Socioeconomic History    Marital status:      Spouse name: Not on file    Number of children: Not on file    Years of education: Not on file    Highest education level: Not on file   Occupational History    Not on file   Tobacco Use    Smoking status: Former     Types: Cigarettes     Quit date: 2022     Years since quittin.9    Smokeless tobacco: Never    Tobacco comments:     6-7 cigs a day   Vaping Use    Vaping Use: Never used   Substance and Sexual Activity    Alcohol use: No    Drug use: No    Sexual activity: Yes     Partners: Female   Other Topics Concern    Not on file   Social History Narrative    Not on file     Social Determinants of Health     Financial Resource Strain: Not on file   Food Insecurity: Not on file   Transportation Needs: Not on file   Physical Activity: Not on file   Stress: Not on file   Social Connections: Not on file   Intimate Partner Violence: Not on file   Housing Stability: Not on file     Family History   Problem Relation Age of Onset    No Known Problems Mother     No Known Problems Father      Immunization History   Administered Date(s) Administered    COVID-19, MODERNA BLUE border, Primary or Immunocompromised, (age 18y+), IM, 100 mcg/0.5mL 04/30/2021, 05/28/2021    COVID-19, MODERNA Booster BLUE border, (age 18y+), IM, 50mcg/0.25mL 12/27/2021    Influenza Vaccine 09/16/2017    Influenza, FLUARIX, FLULAVAL, FLUZONE (age 10 mo+) AND AFLURIA, (age 1 y+), PF, 0.5mL 09/26/2018, 10/31/2019, 10/29/2020    MMR 09/16/2017, 12/21/2017    Poliovirus vaccine 09/16/2017    Td 12/21/2017    Tdap 09/16/2017      No Known Allergies    No visits with results within 3 Month(s) from this visit. Latest known visit with results is:   Orders Only on 07/19/2022   Component Date Value Ref Range Status    Hep C virus Ab Interp. 07/19/2022 NONREACTIVE  NONREACTIVE   Final    Method used is Siemens Advia Experience Headphonesaur    Cholesterol, total 07/19/2022 145  <200 MG/DL Final    Triglyceride 07/19/2022 202 (A)  <150 MG/DL Final    Based on NCEP-ATP III:  Triglycerides <150 mg/dL  is considered normal, 150-199 mg/dL  borderline high,  200-499 mg/dL high and  greater than or equal to 500 mg/dL very high. HDL Cholesterol 07/19/2022 42  MG/DL Final    Based on NCEP ATP III, HDL Cholesterol <40 mg/dL is considered low and >60 mg/dL is elevated.     LDL, calculated 07/19/2022 62.6  0 - 100 MG/DL Final    Comment: Based on the NCEP-ATP: LDL-C concentrations are considered  optimal <100 mg/dL,  near optimal/above Normal 100-129 mg/dL  Borderline High: 130-159, High: 160-189 mg/dL  Very High: Greater than or equal to 190 mg/dL      VLDL, calculated 07/19/2022 40.4  MG/DL Final CHOL/HDL Ratio 07/19/2022 3.5  0.0 - 5.0   Final    Sodium 07/19/2022 140  136 - 145 mmol/L Final    Potassium 07/19/2022 4.1  3.5 - 5.1 mmol/L Final    Chloride 07/19/2022 108  97 - 108 mmol/L Final    CO2 07/19/2022 28  21 - 32 mmol/L Final    Anion gap 07/19/2022 4 (A)  5 - 15 mmol/L Final    Glucose 07/19/2022 98  65 - 100 mg/dL Final    BUN 07/19/2022 12  6 - 20 MG/DL Final    Creatinine 07/19/2022 0.86  0.70 - 1.30 MG/DL Final    BUN/Creatinine ratio 07/19/2022 14  12 - 20   Final    GFR est AA 07/19/2022 >60  >60 ml/min/1.73m2 Final    GFR est non-AA 07/19/2022 >60  >60 ml/min/1.73m2 Final    Estimated GFR is calculated using the IDMS-traceable Modification of Diet in Renal Disease (MDRD) Study equation, reported for both  Americans (GFRAA) and non- Americans (GFRNA), and normalized to 1.73m2 body surface area. The physician must decide which value applies to the patient. Calcium 07/19/2022 9.2  8.5 - 10.1 MG/DL Final    Bilirubin, total 07/19/2022 0.8  0.2 - 1.0 MG/DL Final    ALT (SGPT) 07/19/2022 29  12 - 78 U/L Final    AST (SGOT) 07/19/2022 18  15 - 37 U/L Final    Alk.  phosphatase 07/19/2022 58  45 - 117 U/L Final    Protein, total 07/19/2022 6.9  6.4 - 8.2 g/dL Final    Albumin 07/19/2022 3.8  3.5 - 5.0 g/dL Final    Globulin 07/19/2022 3.1  2.0 - 4.0 g/dL Final    A-G Ratio 07/19/2022 1.2  1.1 - 2.2   Final    WBC 07/19/2022 6.1  4.1 - 11.1 K/uL Final    RBC 07/19/2022 4.66  4.10 - 5.70 M/uL Final    HGB 07/19/2022 14.6  12.1 - 17.0 g/dL Final    HCT 07/19/2022 42.8  36.6 - 50.3 % Final    MCV 07/19/2022 91.8  80.0 - 99.0 FL Final    MCH 07/19/2022 31.3  26.0 - 34.0 PG Final    MCHC 07/19/2022 34.1  30.0 - 36.5 g/dL Final    RDW 07/19/2022 12.5  11.5 - 14.5 % Final    PLATELET 83/64/8788 205  150 - 400 K/uL Final    MPV 07/19/2022 11.6  8.9 - 12.9 FL Final    NRBC 07/19/2022 0.0  0  WBC Final    ABSOLUTE NRBC 07/19/2022 0.00  0.00 - 0.01 K/uL Final    NEUTROPHILS 07/19/2022 40 32 - 75 % Final    LYMPHOCYTES 07/19/2022 44  12 - 49 % Final    MONOCYTES 07/19/2022 12  5 - 13 % Final    EOSINOPHILS 07/19/2022 4  0 - 7 % Final    BASOPHILS 07/19/2022 0  0 - 1 % Final    IMMATURE GRANULOCYTES 07/19/2022 0  0.0 - 0.5 % Final    ABS. NEUTROPHILS 07/19/2022 2.4  1.8 - 8.0 K/UL Final    ABS. LYMPHOCYTES 07/19/2022 2.7  0.8 - 3.5 K/UL Final    ABS. MONOCYTES 07/19/2022 0.7  0.0 - 1.0 K/UL Final    ABS. EOSINOPHILS 07/19/2022 0.2  0.0 - 0.4 K/UL Final    ABS. BASOPHILS 07/19/2022 0.0  0.0 - 0.1 K/UL Final    ABS. IMM. GRANS. 07/19/2022 0.0  0.00 - 0.04 K/UL Final    DF 07/19/2022 AUTOMATED    Final      XR SPINE LUMB 2 OR 3 V  Narrative: EXAM: XR SPINE LUMB 2 OR 3 V    INDICATION: Dx: Acute bilateral low back pain without sciatica [M54.50  (ICD-10-CM    COMPARISON: None. TECHNIQUE: Lumbosacral spine, 3 views    FINDINGS:  Alignment is anatomic. Vertebral body and disc space heights are maintained. Degenerative endplate osteophytes at L3-L4. Lower lumbosacral facet arthropathy. Age-indeterminate fracture deformity of the lower sacrum/upper coccyx, correlate  with point tenderness. Impression: 1. No acute lumbar spine compression fracture nor subluxation. 2.   Age-indeterminate fracture deformity of the lower sacrum/upper coccyx,  correlate with point tenderness. The past medical history, past surgical history, and family history were reviewed and updated in the medical record. Lab values/Imaging were reviewed. The medications were reviewed and updated in the medical record. Immunizations were reviewed and updated in the medical record. All relevant preventative screenings reviewed and updated in the medical record. REVIEW OF SYSTEMS   Review of Systems   Constitutional:  Negative for chills, diaphoresis, fever and malaise/fatigue. Respiratory:  Negative for cough, shortness of breath and wheezing. Cardiovascular:  Positive for palpitations.  Negative for chest pain, orthopnea, claudication, leg swelling and PND. Gastrointestinal:  Negative for diarrhea, heartburn and nausea. Neurological:  Negative for dizziness, weakness and headaches. PHYSICAL EXAM   /71 (BP 1 Location: Left upper arm, BP Patient Position: Sitting, BP Cuff Size: Small adult)   Pulse 71   Temp 98.4 °F (36.9 °C)   Resp 16   Ht 5' 8\" (1.727 m)   Wt 195 lb (88.5 kg)   SpO2 99%   BMI 29.65 kg/m²      Physical Exam  Constitutional:       General: He is not in acute distress. Appearance: He is not ill-appearing or diaphoretic. Eyes:      Pupils: Pupils are equal, round, and reactive to light. Cardiovascular:      Rate and Rhythm: Normal rate and regular rhythm. Pulses: Normal pulses. Heart sounds: Normal heart sounds. No murmur heard. No friction rub. No gallop. Pulmonary:      Effort: Pulmonary effort is normal.      Breath sounds: Normal breath sounds. Skin:     Capillary Refill: Capillary refill takes less than 2 seconds. Neurological:      General: No focal deficit present. Mental Status: He is alert. Cranial Nerves: No cranial nerve deficit. Motor: No weakness. Psychiatric:         Mood and Affect: Mood normal.          ASSESSMENT/ PLAN   Below is the assessment and plan developed based on review of pertinent history, physical exam, labs, studies, and medications. 1. Facet arthritis of lumbosacral region  Continue with at home exercises. Will refer to Ortho/Spine if symptoms worsen. 2. Deformity of bone  Continue with at home exercises. Will refer to Ortho/Spine if symptoms worsen. 3. Hypertriglyceridemia  Educated patient to limit red meat/shellfish/fatty food intake. Will re-check Lipid Panel at next appointment.   -     REFERRAL TO CARDIOLOGY  4. Palpitations  -     AMB POC EKG ROUTINE W/ 12 LEADS, INTER & REP  -     MAGNESIUM; Future  -     METABOLIC PANEL, COMPREHENSIVE; Future  -     TSH 3RD GENERATION;  Future  -     REFERRAL TO CARDIOLOGY  5. Excessive caffeine intake  -     REFERRAL TO CARDIOLOGY  6. Current smoker  -     REFERRAL TO CARDIOLOGY     Advised patient to reduce caffeine and nicotine intake. Follow-up and Dispositions    Return if symptoms worsen or fail to improve. Disclaimer:  Advised patient to call back or return to office if symptoms worsen/change/persist.  Discussed expected course/resolution/complications of diagnosis in detail with patient. Medication risks/benefits/alternatives discussed with patient. Patient was given an after visit summary which includes diagnoses, current medications, & vitals. Discussed patient instructions and advised to read to all patient instructions regarding care. Patient expressed understanding with the diagnosis and plan.        Britney Lerma NP  2/6/2023

## 2023-02-07 LAB
ALBUMIN SERPL-MCNC: 4.1 G/DL (ref 3.5–5)
ALBUMIN/GLOB SERPL: 1.3 (ref 1.1–2.2)
ALP SERPL-CCNC: 65 U/L (ref 45–117)
ALT SERPL-CCNC: 32 U/L (ref 12–78)
ANION GAP SERPL CALC-SCNC: 0 MMOL/L (ref 5–15)
AST SERPL-CCNC: 18 U/L (ref 15–37)
BILIRUB SERPL-MCNC: 0.5 MG/DL (ref 0.2–1)
BUN SERPL-MCNC: 16 MG/DL (ref 6–20)
BUN/CREAT SERPL: 13 (ref 12–20)
CALCIUM SERPL-MCNC: 9.7 MG/DL (ref 8.5–10.1)
CHLORIDE SERPL-SCNC: 111 MMOL/L (ref 97–108)
CO2 SERPL-SCNC: 30 MMOL/L (ref 21–32)
COMMENT, HOLDF: NORMAL
CREAT SERPL-MCNC: 1.2 MG/DL (ref 0.7–1.3)
GLOBULIN SER CALC-MCNC: 3.2 G/DL (ref 2–4)
GLUCOSE SERPL-MCNC: 103 MG/DL (ref 65–100)
MAGNESIUM SERPL-MCNC: 2.4 MG/DL (ref 1.6–2.4)
POTASSIUM SERPL-SCNC: 4.2 MMOL/L (ref 3.5–5.1)
PROT SERPL-MCNC: 7.3 G/DL (ref 6.4–8.2)
SAMPLES BEING HELD,HOLD: NORMAL
SODIUM SERPL-SCNC: 141 MMOL/L (ref 136–145)
TSH SERPL DL<=0.05 MIU/L-ACNC: 1.39 UIU/ML (ref 0.36–3.74)

## 2023-02-14 ENCOUNTER — OFFICE VISIT (OUTPATIENT)
Dept: CARDIOLOGY CLINIC | Age: 43
End: 2023-02-14
Payer: COMMERCIAL

## 2023-02-14 VITALS
BODY MASS INDEX: 31.82 KG/M2 | HEIGHT: 65 IN | HEART RATE: 84 BPM | DIASTOLIC BLOOD PRESSURE: 72 MMHG | SYSTOLIC BLOOD PRESSURE: 122 MMHG | WEIGHT: 191 LBS | OXYGEN SATURATION: 97 % | RESPIRATION RATE: 20 BRPM

## 2023-02-14 DIAGNOSIS — R00.2 HEART PALPITATIONS: Primary | ICD-10-CM

## 2023-02-14 DIAGNOSIS — Z72.0 TOBACCO USE: ICD-10-CM

## 2023-02-14 DIAGNOSIS — Z71.6 ENCOUNTER FOR COUNSELING FOR TOBACCO USE DISORDER: ICD-10-CM

## 2023-02-14 PROCEDURE — 99204 OFFICE O/P NEW MOD 45 MIN: CPT | Performed by: INTERNAL MEDICINE

## 2023-02-14 NOTE — PROGRESS NOTES
[facility] [place]: Amaris Carpenter  [phone]    History of Present Illness:   Mr. Amanda Najera is a 39 yo M with history of tobacco use on and off over the years, referred by his primary care physician for cardiac evaluation. He is here due to palpitations. These have been occurring over the last few weeks, a few times a week, usually lasting a few seconds at a time, often times when he is doing something physically or when he is tired. Overall, his breathing has been okay. He does note in the past he has had similar palpitations when he was sick with things like the flu. He denies any prior cardiac history. He is compensated on exam with clear lungs and no lower extremity edema. His EKG I reviewed personally from 02/06/2023 was normal sinus rhythm, nonspecific ST-T wave abnormality. Soc hx. Tobacco on/off for a few years. 1 pack last few weeks. Fam hx. No early CAD  Assessment and Plan:   1. Palpitations. Will obtain a one month loop monitor to evaluate for possible arrhythmia. I did let him know if he had typical spells while he was wearing the monitor that he could turn it in earlier. 2. Tobacco use. Encouraged complete cessation. He  has no past medical history of Anemia, Asthma, or Calculus of kidney. All other systems negative except as above. PE  Vitals:    02/14/23 1458   BP: 122/72   Pulse: 84   Resp: 20   SpO2: 97%   Weight: 191 lb (86.6 kg)   Height: 5' 5\" (1.651 m)    Body mass index is 31.78 kg/m².   General appearance - alert, well appearing, and in no distress  Mental status - affect appropriate to mood  Eyes - sclera anicteric, moist mucous membranes  Neck - supple, no JVD  Chest - clear to auscultation, no wheezes, rales or rhonchi  Heart - normal rate, regular rhythm, normal S1, S2, no murmurs, rubs, clicks or gallops  Abdomen - soft, nontender, nondistended, no masses or organomegaly  Back exam - full range of motion, no tenderness  Neurological - no focal deficits  Extremities - peripheral pulses normal, no pedal edema      Recent Labs:  Lab Results   Component Value Date/Time    Cholesterol, total 145 2022 07:46 AM    HDL Cholesterol 42 2022 07:46 AM    LDL, calculated 62.6 2022 07:46 AM    Triglyceride 202 (H) 2022 07:46 AM    CHOL/HDL Ratio 3.5 2022 07:46 AM     Lab Results   Component Value Date/Time    Creatinine 1.20 2023 03:36 PM     Lab Results   Component Value Date/Time    BUN 16 2023 03:36 PM     Lab Results   Component Value Date/Time    Potassium 4.2 2023 03:36 PM     Lab Results   Component Value Date/Time    Hemoglobin A1c 4.9 12/15/2020 07:15 AM     Lab Results   Component Value Date/Time    HGB 14.6 2022 07:46 AM     Lab Results   Component Value Date/Time    PLATELET 179  07:46 AM       Reviewed:  History reviewed. No pertinent past medical history. Social History     Tobacco Use   Smoking Status Every Day    Types: Cigarettes    Last attempt to quit: 2022    Years since quittin.0   Smokeless Tobacco Never   Tobacco Comments    5-6 cigs a day     Social History     Substance and Sexual Activity   Alcohol Use No     No Known Allergies    No current outpatient medications on file. No current facility-administered medications for this visit.        Tyrone Reagan MD  [facility] [place]  [address] [facility] [address]  [address]

## 2023-02-14 NOTE — LETTER
Patient:  Kevin Stewart   YOB: 1980  Date of Visit: 2/14/2023      Dear Dwayne Fuentes NP  [address] [id number]  [address] [facility] [address]: Thank you for referring Mr. Kevin Stewart to me for evaluation/treatment. Below are the relevant portions of my assessment and plan of care. Mr. Radha Olivarez is a 39 yo M with history of tobacco use on and off over the years, referred by his primary care physician for cardiac evaluation. He is here due to palpitations. These have been occurring over the last few weeks, a few times a week, usually lasting a few seconds at a time, often times when he is doing something physically or when he is tired. Overall, his breathing has been okay. He does note in the past he has had similar palpitations when he was sick with things like the flu. He denies any prior cardiac history. He is compensated on exam with clear lungs and no lower extremity edema. His EKG I reviewed personally from 02/06/2023 was normal sinus rhythm, nonspecific ST-T wave abnormality. Soc hx. Tobacco on/off for a few years. 1 pack last few weeks. Fam hx. No early CAD  Assessment and Plan:   1. Palpitations. Will obtain a one month loop monitor to evaluate for possible arrhythmia. I did let him know if he had typical spells while he was wearing the monitor that he could turn it in earlier. 2. Tobacco use. Encouraged complete cessation. If you have questions, please do not hesitate to call me.     Sincerely,      Eleni Corley MD

## 2023-02-14 NOTE — PATIENT INSTRUCTIONS
A 30 day loop monitor has been ordered for you. This will be mailed to the address given to us. Please read over the instructions given to you about loop monitors. Save the pre-paid box so that you can use it to mail monitor back to company. Your heart rate and rhythm will be monitored continuously and our office will be notified regarding any concerns. If you have any insurance questions regarding coverage and out of pocket cost please contact the number on the instructions.

## 2023-02-15 ENCOUNTER — TELEPHONE (OUTPATIENT)
Dept: CARDIOLOGY CLINIC | Age: 43
End: 2023-02-15

## 2023-02-15 NOTE — TELEPHONE ENCOUNTER
Enrolled with [employer] - Ordered and being shipped to patient's home address on file. ETA within 5-7 business days. 30 day loop  Received: Law Overton, RN  Tisha Ramirez! This patient needs a 30 day loop monitor mailed for palps per Dr. Amaris Carpenter.       Thanks!

## 2023-03-15 ENCOUNTER — TELEPHONE (OUTPATIENT)
Dept: CARDIOLOGY CLINIC | Age: 43
End: 2023-03-15

## 2023-03-15 NOTE — TELEPHONE ENCOUNTER
----- Message from Doris Su MD sent at 3/14/2023 11:43 PM EDT -----  Please let pt know loop monitor was normal. thx

## 2023-10-13 ENCOUNTER — TELEMEDICINE (OUTPATIENT)
Dept: PRIMARY CARE CLINIC | Facility: CLINIC | Age: 43
End: 2023-10-13
Payer: COMMERCIAL

## 2023-10-13 DIAGNOSIS — F43.23 ADJUSTMENT REACTION WITH ANXIETY AND DEPRESSION: Primary | ICD-10-CM

## 2023-10-13 PROCEDURE — 99212 OFFICE O/P EST SF 10 MIN: CPT | Performed by: FAMILY MEDICINE

## 2023-10-13 NOTE — PROGRESS NOTES
Fernandez Blanc, was evaluated through a synchronous (real-time) audio-video encounter. The patient (or guardian if applicable) is aware that this is a billable service, which includes applicable co-pays. This Virtual Visit was conducted with patient's (and/or legal guardian's) consent. Patient identification was verified, and a caregiver was present when appropriate. The patient was located at Home: [address] [place] [id number]  Provider was located at Home ([facility]): [address] [place] [address] (:  1980) is a Established patient, presenting virtually for evaluation of the following:    Assessment & Plan   Below is the assessment and plan developed based on review of pertinent history, physical exam, labs, studies, and medications. Fernandez was seen today for stress. Diagnoses and all orders for this visit:    Adjustment reaction with anxiety and depression    He is not interested in taking medication at this time. Discussed stress reduction, regular exercise. He wishes to try otc supplement for a few weeks. Suggested he may consider st johns wort or ashwagandha and follow up 2-3 weeks. Follow up or seek care if mood worsens. Subjective   HPI    C/o feeling overwhelmed and stressed. He recently started a new job and he is also in school. Feeling down, negative thoughts. Having trouble focusing and maintaining his attention for tasks. No thoughts of self harm. Sleeping ok, about 7 hours per day. He is exercising regularly. Eating well, healthy diet. Denies tobacco or alcohol use. Review of Systems     Denies exertional chest pain or dyspnea.        Objective     Physical Exam    Constitutional: [x] Appears well-developed and well-nourished [x] No apparent distress      [] Abnormal -     Mental status: [x] Alert and awake  [x] Oriented to person/place/time [x] Able to follow commands    [] Abnormal -     Eyes:   EOM    [x]  Normal    [] Abnormal -   Sclera  [x]

## 2023-11-07 ENCOUNTER — OFFICE VISIT (OUTPATIENT)
Dept: PRIMARY CARE CLINIC | Facility: CLINIC | Age: 43
End: 2023-11-07
Payer: COMMERCIAL

## 2023-11-07 VITALS
BODY MASS INDEX: 32.42 KG/M2 | OXYGEN SATURATION: 99 % | HEIGHT: 65 IN | TEMPERATURE: 97.3 F | WEIGHT: 194.6 LBS | HEART RATE: 69 BPM | DIASTOLIC BLOOD PRESSURE: 75 MMHG | SYSTOLIC BLOOD PRESSURE: 115 MMHG | RESPIRATION RATE: 16 BRPM

## 2023-11-07 DIAGNOSIS — E78.1 HYPERTRIGLYCERIDEMIA: ICD-10-CM

## 2023-11-07 DIAGNOSIS — Z87.898 HISTORY OF PALPITATIONS: ICD-10-CM

## 2023-11-07 DIAGNOSIS — Z00.00 ANNUAL PHYSICAL EXAM: ICD-10-CM

## 2023-11-07 DIAGNOSIS — F43.23 ADJUSTMENT REACTION WITH ANXIETY AND DEPRESSION: Primary | ICD-10-CM

## 2023-11-07 DIAGNOSIS — R73.02 IMPAIRED GLUCOSE TOLERANCE (ORAL): ICD-10-CM

## 2023-11-07 DIAGNOSIS — Z23 ENCOUNTER FOR IMMUNIZATION: ICD-10-CM

## 2023-11-07 DIAGNOSIS — E66.09 CLASS 1 OBESITY DUE TO EXCESS CALORIES WITH SERIOUS COMORBIDITY AND BODY MASS INDEX (BMI) OF 32.0 TO 32.9 IN ADULT: ICD-10-CM

## 2023-11-07 DIAGNOSIS — M47.817 SPONDYLOSIS OF LUMBOSACRAL REGION WITHOUT MYELOPATHY OR RADICULOPATHY: ICD-10-CM

## 2023-11-07 DIAGNOSIS — Z01.89 ENCOUNTER FOR ROUTINE LABORATORY TESTING: ICD-10-CM

## 2023-11-07 DIAGNOSIS — M51.36 LUMBAR DEGENERATIVE DISC DISEASE: ICD-10-CM

## 2023-11-07 PROCEDURE — 90471 IMMUNIZATION ADMIN: CPT | Performed by: NURSE PRACTITIONER

## 2023-11-07 PROCEDURE — 90674 CCIIV4 VAC NO PRSV 0.5 ML IM: CPT | Performed by: NURSE PRACTITIONER

## 2023-11-07 PROCEDURE — 99214 OFFICE O/P EST MOD 30 MIN: CPT | Performed by: NURSE PRACTITIONER

## 2023-11-07 ASSESSMENT — PATIENT HEALTH QUESTIONNAIRE - PHQ9
1. LITTLE INTEREST OR PLEASURE IN DOING THINGS: 0
SUM OF ALL RESPONSES TO PHQ QUESTIONS 1-9: 0
SUM OF ALL RESPONSES TO PHQ9 QUESTIONS 1 & 2: 0
SUM OF ALL RESPONSES TO PHQ QUESTIONS 1-9: 0
2. FEELING DOWN, DEPRESSED OR HOPELESS: 0

## 2023-11-07 ASSESSMENT — ENCOUNTER SYMPTOMS
SHORTNESS OF BREATH: 0
RHINORRHEA: 0
DIARRHEA: 0
SORE THROAT: 0
CHEST TIGHTNESS: 0
ABDOMINAL PAIN: 0
EYE DISCHARGE: 0
BACK PAIN: 0
COLOR CHANGE: 0
COUGH: 0
CONSTIPATION: 0

## 2023-11-08 DIAGNOSIS — Z00.00 ANNUAL PHYSICAL EXAM: ICD-10-CM

## 2023-11-08 DIAGNOSIS — R73.02 IMPAIRED GLUCOSE TOLERANCE (ORAL): ICD-10-CM

## 2023-11-08 DIAGNOSIS — Z01.89 ENCOUNTER FOR ROUTINE LABORATORY TESTING: ICD-10-CM

## 2023-11-08 DIAGNOSIS — E78.1 HYPERTRIGLYCERIDEMIA: ICD-10-CM

## 2023-11-08 DIAGNOSIS — E66.09 CLASS 1 OBESITY DUE TO EXCESS CALORIES WITH SERIOUS COMORBIDITY AND BODY MASS INDEX (BMI) OF 32.0 TO 32.9 IN ADULT: ICD-10-CM

## 2023-11-09 LAB
ALBUMIN SERPL-MCNC: 4.1 G/DL (ref 3.5–5)
ALBUMIN/GLOB SERPL: 1.3 (ref 1.1–2.2)
ALP SERPL-CCNC: 70 U/L (ref 45–117)
ALT SERPL-CCNC: 28 U/L (ref 12–78)
ANION GAP SERPL CALC-SCNC: 6 MMOL/L (ref 5–15)
APPEARANCE UR: CLEAR
AST SERPL-CCNC: 17 U/L (ref 15–37)
BACTERIA URNS QL MICRO: NEGATIVE /HPF
BILIRUB SERPL-MCNC: 1.3 MG/DL (ref 0.2–1)
BILIRUB UR QL: NEGATIVE
BUN SERPL-MCNC: 11 MG/DL (ref 6–20)
BUN/CREAT SERPL: 13 (ref 12–20)
CALCIUM SERPL-MCNC: 9.6 MG/DL (ref 8.5–10.1)
CHLORIDE SERPL-SCNC: 107 MMOL/L (ref 97–108)
CHOLEST SERPL-MCNC: 135 MG/DL
CO2 SERPL-SCNC: 27 MMOL/L (ref 21–32)
COLOR UR: NORMAL
CREAT SERPL-MCNC: 0.88 MG/DL (ref 0.7–1.3)
EPITH CASTS URNS QL MICRO: NORMAL /LPF
ERYTHROCYTE [DISTWIDTH] IN BLOOD BY AUTOMATED COUNT: 12 % (ref 11.5–14.5)
EST. AVERAGE GLUCOSE BLD GHB EST-MCNC: 100 MG/DL
GLOBULIN SER CALC-MCNC: 3.2 G/DL (ref 2–4)
GLUCOSE SERPL-MCNC: 91 MG/DL (ref 65–100)
GLUCOSE UR STRIP.AUTO-MCNC: NEGATIVE MG/DL
HBA1C MFR BLD: 5.1 % (ref 4–5.6)
HCT VFR BLD AUTO: 45.1 % (ref 36.6–50.3)
HDLC SERPL-MCNC: 41 MG/DL
HDLC SERPL: 3.3 (ref 0–5)
HGB BLD-MCNC: 15.3 G/DL (ref 12.1–17)
HGB UR QL STRIP: NEGATIVE
HYALINE CASTS URNS QL MICRO: NORMAL /LPF (ref 0–5)
KETONES UR QL STRIP.AUTO: NEGATIVE MG/DL
LDLC SERPL CALC-MCNC: 63.6 MG/DL (ref 0–100)
LEUKOCYTE ESTERASE UR QL STRIP.AUTO: NEGATIVE
MCH RBC QN AUTO: 31 PG (ref 26–34)
MCHC RBC AUTO-ENTMCNC: 33.9 G/DL (ref 30–36.5)
MCV RBC AUTO: 91.3 FL (ref 80–99)
NITRITE UR QL STRIP.AUTO: NEGATIVE
NRBC # BLD: 0 K/UL (ref 0–0.01)
NRBC BLD-RTO: 0 PER 100 WBC
PH UR STRIP: 7.5 (ref 5–8)
PLATELET # BLD AUTO: 229 K/UL (ref 150–400)
PMV BLD AUTO: 11.8 FL (ref 8.9–12.9)
POTASSIUM SERPL-SCNC: 4.3 MMOL/L (ref 3.5–5.1)
PROT SERPL-MCNC: 7.3 G/DL (ref 6.4–8.2)
PROT UR STRIP-MCNC: NEGATIVE MG/DL
RBC # BLD AUTO: 4.94 M/UL (ref 4.1–5.7)
RBC #/AREA URNS HPF: NORMAL /HPF (ref 0–5)
SODIUM SERPL-SCNC: 140 MMOL/L (ref 136–145)
SP GR UR REFRACTOMETRY: 1.01 (ref 1–1.03)
TRIGL SERPL-MCNC: 152 MG/DL
UROBILINOGEN UR QL STRIP.AUTO: 0.2 EU/DL (ref 0.2–1)
VLDLC SERPL CALC-MCNC: 30.4 MG/DL
WBC # BLD AUTO: 6.6 K/UL (ref 4.1–11.1)
WBC URNS QL MICRO: NORMAL /HPF (ref 0–4)

## 2023-11-09 NOTE — RESULT ENCOUNTER NOTE
Triglycerides have improved. Continue to limit fatty foods.     Total Bilirubin is mildly elevated. Has been previously.     All other labs are unremarkable.

## 2023-11-27 ENCOUNTER — OFFICE VISIT (OUTPATIENT)
Age: 43
End: 2023-11-27

## 2023-11-27 VITALS
TEMPERATURE: 98.8 F | WEIGHT: 199 LBS | DIASTOLIC BLOOD PRESSURE: 77 MMHG | SYSTOLIC BLOOD PRESSURE: 121 MMHG | OXYGEN SATURATION: 98 % | BODY MASS INDEX: 33.12 KG/M2 | HEART RATE: 78 BPM

## 2023-11-27 DIAGNOSIS — J01.10 ACUTE NON-RECURRENT FRONTAL SINUSITIS: Primary | ICD-10-CM

## 2023-11-27 RX ORDER — FLUTICASONE PROPIONATE 50 MCG
2 SPRAY, SUSPENSION (ML) NASAL DAILY
Qty: 16 G | Refills: 0 | Status: SHIPPED | OUTPATIENT
Start: 2023-11-27

## 2023-11-27 RX ORDER — FEXOFENADINE HCL 180 MG/1
180 TABLET ORAL DAILY
Qty: 30 TABLET | Refills: 0 | Status: SHIPPED | OUTPATIENT
Start: 2023-11-27 | End: 2023-12-27

## 2023-11-27 RX ORDER — BENZONATATE 200 MG/1
200 CAPSULE ORAL 2 TIMES DAILY PRN
Qty: 10 CAPSULE | Refills: 0 | Status: SHIPPED | OUTPATIENT
Start: 2023-11-27 | End: 2023-12-02

## 2023-11-27 RX ORDER — DEXTROMETHORPHAN HYDROBROMIDE AND PROMETHAZINE HYDROCHLORIDE 15; 6.25 MG/5ML; MG/5ML
5 SYRUP ORAL 4 TIMES DAILY PRN
Qty: 100 ML | Refills: 0 | Status: SHIPPED | OUTPATIENT
Start: 2023-11-27 | End: 2023-12-02

## 2023-11-27 RX ORDER — FLUTICASONE PROPIONATE 50 MCG
2 SPRAY, SUSPENSION (ML) NASAL DAILY
Qty: 16 G | Refills: 0 | Status: SHIPPED | OUTPATIENT
Start: 2023-11-27 | End: 2023-11-27 | Stop reason: CLARIF

## 2023-11-27 ASSESSMENT — ENCOUNTER SYMPTOMS: COUGH: 1

## 2024-02-02 ENCOUNTER — TELEPHONE (OUTPATIENT)
Dept: PRIMARY CARE CLINIC | Facility: CLINIC | Age: 44
End: 2024-02-02

## 2024-02-02 NOTE — TELEPHONE ENCOUNTER
----- Message from Nuvia Missy sent at 2/2/2024 10:52 AM EST -----  Subject: Appointment Request    Reason for Call: Established Patient Appointment needed: Routine ED Follow   Up Visit    QUESTIONS    Reason for appointment request? No appointments available during search     Additional Information for Provider? Pt is needing an appointment for an   ER follow up. pt is having stomach pain and discomfort and was in the ER   on 1/28/24 please call back to schedule as no appointments were available.     ---------------------------------------------------------------------------  --------------  CALL BACK INFO  [phone]; OK to leave message on voicemail  ---------------------------------------------------------------------------  --------------  SCRIPT ANSWERS

## 2024-02-13 ENCOUNTER — OFFICE VISIT (OUTPATIENT)
Dept: PRIMARY CARE CLINIC | Facility: CLINIC | Age: 44
End: 2024-02-13
Payer: MEDICAID

## 2024-02-13 VITALS
HEART RATE: 84 BPM | HEIGHT: 65 IN | WEIGHT: 191.8 LBS | SYSTOLIC BLOOD PRESSURE: 112 MMHG | DIASTOLIC BLOOD PRESSURE: 72 MMHG | BODY MASS INDEX: 31.96 KG/M2 | OXYGEN SATURATION: 97 % | RESPIRATION RATE: 16 BRPM | TEMPERATURE: 97.8 F

## 2024-02-13 DIAGNOSIS — Z87.09 HISTORY OF SINUSITIS: ICD-10-CM

## 2024-02-13 DIAGNOSIS — M54.50 ACUTE LEFT-SIDED LOW BACK PAIN WITHOUT SCIATICA: ICD-10-CM

## 2024-02-13 DIAGNOSIS — R42 DIZZINESS: ICD-10-CM

## 2024-02-13 DIAGNOSIS — F32.A ANXIETY AND DEPRESSION: Primary | ICD-10-CM

## 2024-02-13 DIAGNOSIS — R93.7 ABNORMAL X-RAY OF SPINE: ICD-10-CM

## 2024-02-13 DIAGNOSIS — F41.9 ANXIETY AND DEPRESSION: Primary | ICD-10-CM

## 2024-02-13 DIAGNOSIS — K21.9 GASTROESOPHAGEAL REFLUX DISEASE WITHOUT ESOPHAGITIS: ICD-10-CM

## 2024-02-13 PROCEDURE — 99214 OFFICE O/P EST MOD 30 MIN: CPT | Performed by: NURSE PRACTITIONER

## 2024-02-13 RX ORDER — SERTRALINE HYDROCHLORIDE 25 MG/1
25 TABLET, FILM COATED ORAL DAILY
COMMUNITY
Start: 2024-01-31

## 2024-02-13 RX ORDER — FAMOTIDINE 40 MG/1
40 TABLET, FILM COATED ORAL NIGHTLY PRN
Qty: 30 TABLET | Refills: 0 | Status: SHIPPED | OUTPATIENT
Start: 2024-02-13

## 2024-02-13 RX ORDER — HYDROXYZINE HYDROCHLORIDE 25 MG/1
TABLET, FILM COATED ORAL
COMMUNITY
Start: 2024-01-31

## 2024-02-13 RX ORDER — DICLOFENAC SODIUM 25 MG/1
25 TABLET, DELAYED RELEASE ORAL 2 TIMES DAILY PRN
Qty: 28 TABLET | Refills: 0 | Status: SHIPPED | OUTPATIENT
Start: 2024-02-13

## 2024-02-13 RX ORDER — CYCLOBENZAPRINE HCL 5 MG
5 TABLET ORAL NIGHTLY PRN
Qty: 28 TABLET | Refills: 0 | Status: SHIPPED | OUTPATIENT
Start: 2024-02-13

## 2024-02-13 RX ORDER — TRAZODONE HYDROCHLORIDE 50 MG/1
50 TABLET ORAL NIGHTLY
COMMUNITY
Start: 2024-01-31

## 2024-02-13 NOTE — PROGRESS NOTES
[facility]   [address], [address]  [place] [address]  P: [phone]  F: [phone]    SUBJECTIVE     HPI:     Fernandez Blanc is a 43 y.o. male who is seen in the clinic for   Chief Complaint   Patient presents with    ER follow up     Heartburn and dizziness.    Back Pain     4 days ago     Depression     Patient is seen at [facility]        The patient presents to the office today for ED follow up.     , went to [facility]'s ED for acute episode of dizziness and heartburn.     Labs/EKG were unremarkable.     Dizziness was likely due to changing to Trillentix for Depression.     Has since changed back to taking Sertraline by Psych.     Heartburn has since improved with prn OTC Tums.     Beginning 4 days prior, endorses acute left lower back pain. Denies any sciatica. Pain is worse when sitting. Denies any LE numbness and tingling.     Of note, Lumbar X-ray in  showed:    1.  No acute lumbar spine compression fracture nor subluxation.  2.   Age-indeterminate fracture deformity of the lower sacrum/upper coccyx,  correlate with point tenderness.        , went to Urgent Care for Sinusitis management. Allegra and Flonase were recommended? Prn Promethazine-DM and Tessalon Pearls were rx'd?    Symptoms have since resolved.       Patient Active Problem List   Diagnosis    Depression        History reviewed. No pertinent past medical history.  History reviewed. No pertinent surgical history.  Social History     Socioeconomic History    Marital status:      Spouse name: Not on file    Number of children: Not on file    Years of education: Not on file    Highest education level: Not on file   Occupational History    Not on file   Tobacco Use    Smoking status: Some Days     Current packs/day: 0.00     Types: Cigarettes     Last attempt to quit: 2022     Years since quittin.0    Smokeless tobacco: Never   Vaping Use    Vaping Use: Never used   Substance and

## 2024-02-13 NOTE — PROGRESS NOTES
\"Have you been to the ER, urgent care clinic since your last visit?  Hospitalized since your last visit?\"    Yes 01/28/24    “Have you seen or consulted any other health care providers outside of [facility] since your last visit?”    no

## 2024-02-24 DIAGNOSIS — M54.50 ACUTE LEFT-SIDED LOW BACK PAIN WITHOUT SCIATICA: ICD-10-CM

## 2024-02-25 RX ORDER — DICLOFENAC SODIUM 25 MG/1
25 TABLET, DELAYED RELEASE ORAL 2 TIMES DAILY PRN
Qty: 30 TABLET | Refills: 0 | Status: SHIPPED | OUTPATIENT
Start: 2024-02-25

## 2024-03-11 DIAGNOSIS — K21.9 GASTROESOPHAGEAL REFLUX DISEASE WITHOUT ESOPHAGITIS: ICD-10-CM

## 2024-03-11 RX ORDER — FAMOTIDINE 40 MG/1
TABLET, FILM COATED ORAL
Qty: 30 TABLET | Refills: 1 | Status: SHIPPED | OUTPATIENT
Start: 2024-03-11

## 2024-03-15 DIAGNOSIS — M54.50 ACUTE LEFT-SIDED LOW BACK PAIN WITHOUT SCIATICA: ICD-10-CM

## 2024-03-15 RX ORDER — DICLOFENAC SODIUM 25 MG/1
25 TABLET, DELAYED RELEASE ORAL 2 TIMES DAILY PRN
Qty: 30 TABLET | Refills: 0 | Status: SHIPPED | OUTPATIENT
Start: 2024-03-15

## 2024-05-05 DIAGNOSIS — K21.9 GASTROESOPHAGEAL REFLUX DISEASE WITHOUT ESOPHAGITIS: ICD-10-CM

## 2024-05-07 RX ORDER — FAMOTIDINE 40 MG/1
40 TABLET, FILM COATED ORAL NIGHTLY
Qty: 30 TABLET | Refills: 1 | Status: SHIPPED | OUTPATIENT
Start: 2024-05-07 | End: 2024-08-05

## 2024-05-07 NOTE — TELEPHONE ENCOUNTER
PCP: Vick Christensen APRN - NP    Last Visit 2/13/2024   No future appointments.    Requested Prescriptions     Pending Prescriptions Disp Refills    famotidine (PEPCID) 40 MG tablet [Pharmacy Med Name: FAMOTIDINE 40MG TABLETS] 30 tablet 1     Sig: TAKE 1 TABLET BY MOUTH EVERY NIGHT AS NEEDED FOR HEARTBURN         Other Comments: Last Refill 3/11/2024

## 2024-07-07 DIAGNOSIS — K21.9 GASTROESOPHAGEAL REFLUX DISEASE WITHOUT ESOPHAGITIS: ICD-10-CM

## 2024-07-09 RX ORDER — FAMOTIDINE 40 MG/1
40 TABLET, FILM COATED ORAL NIGHTLY
Qty: 30 TABLET | Refills: 1 | OUTPATIENT
Start: 2024-07-09

## 2024-07-27 DIAGNOSIS — K21.9 GASTROESOPHAGEAL REFLUX DISEASE WITHOUT ESOPHAGITIS: ICD-10-CM

## 2024-07-29 RX ORDER — FAMOTIDINE 40 MG/1
40 TABLET, FILM COATED ORAL NIGHTLY
Qty: 30 TABLET | Refills: 1 | OUTPATIENT
Start: 2024-07-29 | End: 2024-10-27

## 2024-08-22 ENCOUNTER — TELEPHONE (OUTPATIENT)
Dept: PRIMARY CARE CLINIC | Facility: CLINIC | Age: 44
End: 2024-08-22

## 2024-08-22 NOTE — TELEPHONE ENCOUNTER
----- Message from Leeann AGUIRRE sent at 8/19/2024  2:43 PM EDT -----  Regarding: [facility] Appointment Request  [facility] Appointment Request    Patient needs appointment for [facility] Appointment Type: New to Provider.    Patient Requested Dates(s): if possible in August   Patient Requested Time:8: 00 am  Provider Name:    Reason for Appointment Request: Established Patient - No appointments available during search// Pt wanted to have a new to provider appt and to have his Annual Physical, Last Visit was on 11/07/2023   To any doctor within the practice and if possible on August at any day and if possible at 8:00 AM  --------------------------------------------------------------------------------------------------------------------------    Relationship to Patient: Self     Call Back Information: OK to leave message on voicemail  Preferred Call Back Number: Phone [phone] (home)

## 2024-10-18 ENCOUNTER — OFFICE VISIT (OUTPATIENT)
Dept: PRIMARY CARE CLINIC | Facility: CLINIC | Age: 44
End: 2024-10-18
Payer: MEDICAID

## 2024-10-18 ENCOUNTER — TELEPHONE (OUTPATIENT)
Age: 44
End: 2024-10-18

## 2024-10-18 VITALS
WEIGHT: 196.4 LBS | BODY MASS INDEX: 32.72 KG/M2 | DIASTOLIC BLOOD PRESSURE: 71 MMHG | RESPIRATION RATE: 17 BRPM | TEMPERATURE: 97.7 F | OXYGEN SATURATION: 99 % | HEIGHT: 65 IN | SYSTOLIC BLOOD PRESSURE: 117 MMHG | HEART RATE: 97 BPM

## 2024-10-18 DIAGNOSIS — K62.89 RECTUM PAIN: Primary | ICD-10-CM

## 2024-10-18 PROCEDURE — 99213 OFFICE O/P EST LOW 20 MIN: CPT | Performed by: FAMILY MEDICINE

## 2024-10-18 RX ORDER — HYDROCORTISONE 25 MG/G
CREAM TOPICAL
Qty: 28 G | Refills: 0 | Status: SHIPPED | OUTPATIENT
Start: 2024-10-18

## 2024-10-18 ASSESSMENT — PATIENT HEALTH QUESTIONNAIRE - PHQ9
SUM OF ALL RESPONSES TO PHQ QUESTIONS 1-9: 0
2. FEELING DOWN, DEPRESSED OR HOPELESS: NOT AT ALL
SUM OF ALL RESPONSES TO PHQ QUESTIONS 1-9: 0
SUM OF ALL RESPONSES TO PHQ QUESTIONS 1-9: 0
1. LITTLE INTEREST OR PLEASURE IN DOING THINGS: NOT AT ALL
3. TROUBLE FALLING OR STAYING ASLEEP: NOT AT ALL
SUM OF ALL RESPONSES TO PHQ9 QUESTIONS 1 & 2: 0
SUM OF ALL RESPONSES TO PHQ QUESTIONS 1-9: 0
10. IF YOU CHECKED OFF ANY PROBLEMS, HOW DIFFICULT HAVE THESE PROBLEMS MADE IT FOR YOU TO DO YOUR WORK, TAKE CARE OF THINGS AT HOME, OR GET ALONG WITH OTHER PEOPLE: NOT DIFFICULT AT ALL
1. LITTLE INTEREST OR PLEASURE IN DOING THINGS: NOT AT ALL
8. MOVING OR SPEAKING SO SLOWLY THAT OTHER PEOPLE COULD HAVE NOTICED. OR THE OPPOSITE, BEING SO FIGETY OR RESTLESS THAT YOU HAVE BEEN MOVING AROUND A LOT MORE THAN USUAL: NOT AT ALL
SUM OF ALL RESPONSES TO PHQ QUESTIONS 1-9: 0
6. FEELING BAD ABOUT YOURSELF - OR THAT YOU ARE A FAILURE OR HAVE LET YOURSELF OR YOUR FAMILY DOWN: NOT AT ALL
4. FEELING TIRED OR HAVING LITTLE ENERGY: NOT AT ALL
SUM OF ALL RESPONSES TO PHQ QUESTIONS 1-9: 0
SUM OF ALL RESPONSES TO PHQ QUESTIONS 1-9: 0
7. TROUBLE CONCENTRATING ON THINGS, SUCH AS READING THE NEWSPAPER OR WATCHING TELEVISION: NOT AT ALL
5. POOR APPETITE OR OVEREATING: NOT AT ALL
2. FEELING DOWN, DEPRESSED OR HOPELESS: NOT AT ALL
9. THOUGHTS THAT YOU WOULD BE BETTER OFF DEAD, OR OF HURTING YOURSELF: NOT AT ALL
SUM OF ALL RESPONSES TO PHQ QUESTIONS 1-9: 0
SUM OF ALL RESPONSES TO PHQ9 QUESTIONS 1 & 2: 0

## 2024-10-18 NOTE — PROGRESS NOTES
Subjective  Fernandez Blanc is an 43 y.o. male who presents for rectal discomfort.     Pt c/o rectal burning x 1 month. Discomfort with and after BM.  Improved since onset.   No change in BM.   Denies signs of bleeding.   Has not tried any therapies.     Allergies - reviewed:   No Known Allergies      Medications - reviewed:   Current Outpatient Medications   Medication Sig    hydrocortisone (ANUSOL-HC) 2.5 % CREA rectal cream Apply topically after bowel movement    famotidine (PEPCID) 40 MG tablet Take 1 tablet by mouth at bedtime Needs to establish care with NEW PCP before any additional refills are done.     No current facility-administered medications for this visit.       ROS  Denies any other acute complaints.  Denies signs of bleeding.   Denies change in BM.   No weight loss.       Physical Exam  /71 (Site: Left Upper Arm, Position: Sitting)   Pulse 97   Temp 97.7 °F (36.5 °C) (Temporal)   Resp 17   Ht 1.651 m (5' 5\")   Wt 89.1 kg (196 lb 6.4 oz)   SpO2 99%   BMI 32.68 kg/m²   Physical Exam  Vitals reviewed.   Constitutional:       Appearance: Normal appearance.   HENT:      Head: Normocephalic and atraumatic.   Genitourinary:     Comments: Patient refuses an exam today.   Neurological:      Mental Status: He is alert.       Advised rectal exam but pt refused.     Assessment/Plan   Diagnosis Orders   1. Rectum pain  [facility] - Tamiko Gutierrez MD, Colorectal Surgery, [place] ([address])    hydrocortisone (ANUSOL-HC) 2.5 % CREA rectal cream      Likely consistent with hemorrhoids.  No bleeding.  Discussed conservative measures and expected course.  Advise increase fiber and water intake, stool softener as needed, topical Preparation H as needed.  May use topical steroid for up to 1 week.  Advise follow-up with specialist if not improving as expected or if you develop any signs of bleeding      No follow-up provider specified.      I have discussed the diagnosis with the patient and the intended plan as

## 2024-10-18 NOTE — PROGRESS NOTES
Health Decision Maker has been checked with the patient          Chief Complaint   Patient presents with    Hemorrhoids     Pt states he has been dealing with this for 1 month.       \"Have you been to the ER, urgent care clinic since your last visit?  Hospitalized since your last visit?\"    NO    “Have you seen or consulted any other health care providers outside of [facility] since your last visit?”    Yes, [facility] for back pain.             Click Here for Release of Records Request

## 2024-10-18 NOTE — TELEPHONE ENCOUNTER
Attempted to contact patient regarding referral for Rectum pain. Patient did not answer, LVM requesting a return call if interested in scheduling. Will follow up.

## 2024-11-07 DIAGNOSIS — K21.9 GASTROESOPHAGEAL REFLUX DISEASE WITHOUT ESOPHAGITIS: ICD-10-CM

## 2024-11-07 RX ORDER — FAMOTIDINE 40 MG/1
40 TABLET, FILM COATED ORAL NIGHTLY
Qty: 30 TABLET | Refills: 0 | Status: SHIPPED | OUTPATIENT
Start: 2024-11-07 | End: 2024-12-11

## 2024-11-07 RX ORDER — FAMOTIDINE 40 MG/1
40 TABLET, FILM COATED ORAL NIGHTLY
Qty: 90 TABLET | OUTPATIENT
Start: 2024-11-07

## 2024-11-20 NOTE — PROGRESS NOTES
[facility]   [address], [address]  [place] [address]  P: [phone]  F: [phone]    SUBJECTIVE     HPI:     Fernandez Blanc is a 44 y.o. male who is seen in the clinic for   Chief Complaint   Patient presents with    New Patient    Anxiety     Work related for 1 month-Counselor advised to see PCP for Sertraline    Pharyngitis     Comes and goes        The patient presents to the office today c/o of anxiety  and requesting a physical. He is fasting for labs.    Anxiety and depression  He believes his anxiety is related to his workload. 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 He is also in school and studying for 2-3 hours daily. Sometimes has palpitations and feels like he is going to pass out due to his anxiety. He is seeing a counselor weekly, which has been helping. His counselor recommended he see me to restart sertraline. He did not have any side effects from sertraline.    Sore throat  Notes intermittent sore throat, which lasts only 1-2 days then resolves on its own. He is not taking antihistamines or anything else for this.    Smokeless tobacco use  Planning to cut back.     Health screenings:   Eye exam Done 1 month ago  Dental exam Overdue    COVID vaccine Done 2021    Tdap Done 12/21/2017  Pneumonia vaccine  given today  Influenza  given today     Patient Active Problem List   Diagnosis    Anxiety and depression        History reviewed. No pertinent past medical history.  Current Outpatient Medications   Medication Sig Dispense Refill    sertraline (ZOLOFT) 25 MG tablet Take 1 tablet by mouth daily 90 tablet 1    famotidine (PEPCID) 40 MG tablet Take 1 tablet by mouth at bedtime Needs to establish care with NEW PCP before any additional refills are done. 30 tablet 0    hydrocortisone (ANUSOL-HC) 2.5 % CREA rectal cream Apply topically after bowel movement (Patient not taking: Reported on 11/21/2024) 28 g 0

## 2024-11-21 ENCOUNTER — OFFICE VISIT (OUTPATIENT)
Dept: PRIMARY CARE CLINIC | Facility: CLINIC | Age: 44
End: 2024-11-21

## 2024-11-21 VITALS
RESPIRATION RATE: 16 BRPM | HEIGHT: 65 IN | HEART RATE: 59 BPM | WEIGHT: 195.2 LBS | DIASTOLIC BLOOD PRESSURE: 66 MMHG | BODY MASS INDEX: 32.52 KG/M2 | OXYGEN SATURATION: 98 % | SYSTOLIC BLOOD PRESSURE: 112 MMHG | TEMPERATURE: 97.5 F

## 2024-11-21 DIAGNOSIS — F32.A ANXIETY AND DEPRESSION: ICD-10-CM

## 2024-11-21 DIAGNOSIS — Z23 ENCOUNTER FOR IMMUNIZATION: ICD-10-CM

## 2024-11-21 DIAGNOSIS — F41.9 ANXIETY AND DEPRESSION: ICD-10-CM

## 2024-11-21 DIAGNOSIS — Z72.0 SMOKELESS TOBACCO USE: ICD-10-CM

## 2024-11-21 DIAGNOSIS — Z11.4 ENCOUNTER FOR SCREENING FOR HIV: ICD-10-CM

## 2024-11-21 DIAGNOSIS — J02.9 SORE THROAT: ICD-10-CM

## 2024-11-21 DIAGNOSIS — Z00.00 ANNUAL PHYSICAL EXAM: Primary | ICD-10-CM

## 2024-11-21 DIAGNOSIS — Z00.00 ANNUAL PHYSICAL EXAM: ICD-10-CM

## 2024-11-21 RX ORDER — SERTRALINE HYDROCHLORIDE 25 MG/1
25 TABLET, FILM COATED ORAL DAILY
Qty: 90 TABLET | Refills: 1 | Status: SHIPPED | OUTPATIENT
Start: 2024-11-21

## 2024-11-21 SDOH — ECONOMIC STABILITY: FOOD INSECURITY: WITHIN THE PAST 12 MONTHS, YOU WORRIED THAT YOUR FOOD WOULD RUN OUT BEFORE YOU GOT MONEY TO BUY MORE.: NEVER TRUE

## 2024-11-21 SDOH — ECONOMIC STABILITY: INCOME INSECURITY: HOW HARD IS IT FOR YOU TO PAY FOR THE VERY BASICS LIKE FOOD, HOUSING, MEDICAL CARE, AND HEATING?: NOT HARD AT ALL

## 2024-11-21 SDOH — ECONOMIC STABILITY: FOOD INSECURITY: WITHIN THE PAST 12 MONTHS, THE FOOD YOU BOUGHT JUST DIDN'T LAST AND YOU DIDN'T HAVE MONEY TO GET MORE.: NEVER TRUE

## 2024-11-21 ASSESSMENT — ENCOUNTER SYMPTOMS
DIARRHEA: 0
ABDOMINAL PAIN: 0
SINUS PAIN: 0
CONSTIPATION: 0
VOMITING: 0
WHEEZING: 0
RHINORRHEA: 0
SORE THROAT: 1
SHORTNESS OF BREATH: 0
BLOOD IN STOOL: 0
COUGH: 0
NAUSEA: 0

## 2024-11-21 ASSESSMENT — PATIENT HEALTH QUESTIONNAIRE - PHQ9
SUM OF ALL RESPONSES TO PHQ QUESTIONS 1-9: 0
1. LITTLE INTEREST OR PLEASURE IN DOING THINGS: NOT AT ALL
SUM OF ALL RESPONSES TO PHQ QUESTIONS 1-9: 0
SUM OF ALL RESPONSES TO PHQ QUESTIONS 1-9: 0
2. FEELING DOWN, DEPRESSED OR HOPELESS: NOT AT ALL
SUM OF ALL RESPONSES TO PHQ9 QUESTIONS 1 & 2: 0
SUM OF ALL RESPONSES TO PHQ QUESTIONS 1-9: 0

## 2024-11-21 ASSESSMENT — ANXIETY QUESTIONNAIRES
6. BECOMING EASILY ANNOYED OR IRRITABLE: SEVERAL DAYS
IF YOU CHECKED OFF ANY PROBLEMS ON THIS QUESTIONNAIRE, HOW DIFFICULT HAVE THESE PROBLEMS MADE IT FOR YOU TO DO YOUR WORK, TAKE CARE OF THINGS AT HOME, OR GET ALONG WITH OTHER PEOPLE: SOMEWHAT DIFFICULT
GAD7 TOTAL SCORE: 15
5. BEING SO RESTLESS THAT IT IS HARD TO SIT STILL: SEVERAL DAYS
4. TROUBLE RELAXING: NEARLY EVERY DAY
2. NOT BEING ABLE TO STOP OR CONTROL WORRYING: NEARLY EVERY DAY
7. FEELING AFRAID AS IF SOMETHING AWFUL MIGHT HAPPEN: SEVERAL DAYS
1. FEELING NERVOUS, ANXIOUS, OR ON EDGE: NEARLY EVERY DAY
3. WORRYING TOO MUCH ABOUT DIFFERENT THINGS: NEARLY EVERY DAY

## 2024-11-21 NOTE — PROGRESS NOTES
\"Have you been to the ER, urgent care clinic since your last visit?  Hospitalized since your last visit?\"    no    “Have you seen or consulted any other health care providers outside our system since your last visit?”    Counseling [facility]

## 2024-11-22 LAB
ALBUMIN SERPL-MCNC: 4.1 G/DL (ref 3.5–5)
ALBUMIN/GLOB SERPL: 1.2 (ref 1.1–2.2)
ALP SERPL-CCNC: 72 U/L (ref 45–117)
ALT SERPL-CCNC: 28 U/L (ref 12–78)
ANION GAP SERPL CALC-SCNC: 5 MMOL/L (ref 2–12)
AST SERPL-CCNC: 20 U/L (ref 15–37)
BASOPHILS # BLD: 0 K/UL (ref 0–0.1)
BASOPHILS NFR BLD: 0 % (ref 0–1)
BILIRUB SERPL-MCNC: 0.9 MG/DL (ref 0.2–1)
BUN SERPL-MCNC: 14 MG/DL (ref 6–20)
BUN/CREAT SERPL: 17 (ref 12–20)
CALCIUM SERPL-MCNC: 9.7 MG/DL (ref 8.5–10.1)
CHLORIDE SERPL-SCNC: 108 MMOL/L (ref 97–108)
CHOLEST SERPL-MCNC: 142 MG/DL
CO2 SERPL-SCNC: 26 MMOL/L (ref 21–32)
CREAT SERPL-MCNC: 0.83 MG/DL (ref 0.7–1.3)
DIFFERENTIAL METHOD BLD: NORMAL
EOSINOPHIL # BLD: 0.1 K/UL (ref 0–0.4)
EOSINOPHIL NFR BLD: 3 % (ref 0–7)
ERYTHROCYTE [DISTWIDTH] IN BLOOD BY AUTOMATED COUNT: 12.2 % (ref 11.5–14.5)
GLOBULIN SER CALC-MCNC: 3.3 G/DL (ref 2–4)
GLUCOSE SERPL-MCNC: 95 MG/DL (ref 65–100)
HCT VFR BLD AUTO: 43 % (ref 36.6–50.3)
HDLC SERPL-MCNC: 45 MG/DL
HDLC SERPL: 3.2 (ref 0–5)
HGB BLD-MCNC: 15.1 G/DL (ref 12.1–17)
HIV 1+2 AB+HIV1 P24 AG SERPL QL IA: NONREACTIVE
HIV 1/2 RESULT COMMENT: NORMAL
IMM GRANULOCYTES # BLD AUTO: 0 K/UL (ref 0–0.04)
IMM GRANULOCYTES NFR BLD AUTO: 0 % (ref 0–0.5)
LDLC SERPL CALC-MCNC: 76 MG/DL (ref 0–100)
LYMPHOCYTES # BLD: 2.1 K/UL (ref 0.8–3.5)
LYMPHOCYTES NFR BLD: 40 % (ref 12–49)
MCH RBC QN AUTO: 31.1 PG (ref 26–34)
MCHC RBC AUTO-ENTMCNC: 35.1 G/DL (ref 30–36.5)
MCV RBC AUTO: 88.5 FL (ref 80–99)
MONOCYTES # BLD: 0.5 K/UL (ref 0–1)
MONOCYTES NFR BLD: 9 % (ref 5–13)
NEUTS SEG # BLD: 2.4 K/UL (ref 1.8–8)
NEUTS SEG NFR BLD: 48 % (ref 32–75)
NRBC # BLD: 0 K/UL (ref 0–0.01)
NRBC BLD-RTO: 0 PER 100 WBC
PLATELET # BLD AUTO: 248 K/UL (ref 150–400)
PMV BLD AUTO: 11.3 FL (ref 8.9–12.9)
POTASSIUM SERPL-SCNC: 4.2 MMOL/L (ref 3.5–5.1)
PROT SERPL-MCNC: 7.4 G/DL (ref 6.4–8.2)
RBC # BLD AUTO: 4.86 M/UL (ref 4.1–5.7)
SODIUM SERPL-SCNC: 139 MMOL/L (ref 136–145)
TRIGL SERPL-MCNC: 105 MG/DL
TSH SERPL DL<=0.05 MIU/L-ACNC: 2.17 UIU/ML (ref 0.45–4.5)
VLDLC SERPL CALC-MCNC: 21 MG/DL
WBC # BLD AUTO: 5.1 K/UL (ref 4.1–11.1)

## 2024-12-11 DIAGNOSIS — K21.9 GASTROESOPHAGEAL REFLUX DISEASE WITHOUT ESOPHAGITIS: ICD-10-CM

## 2024-12-11 RX ORDER — FAMOTIDINE 40 MG/1
40 TABLET, FILM COATED ORAL NIGHTLY
Qty: 90 TABLET | Refills: 3 | Status: SHIPPED | OUTPATIENT
Start: 2024-12-11

## 2025-01-20 DIAGNOSIS — F41.9 ANXIETY AND DEPRESSION: ICD-10-CM

## 2025-01-20 DIAGNOSIS — F32.A ANXIETY AND DEPRESSION: ICD-10-CM

## 2025-01-21 DIAGNOSIS — F32.A ANXIETY AND DEPRESSION: ICD-10-CM

## 2025-01-21 DIAGNOSIS — F41.9 ANXIETY AND DEPRESSION: ICD-10-CM

## 2025-01-21 RX ORDER — SERTRALINE HYDROCHLORIDE 25 MG/1
25 TABLET, FILM COATED ORAL DAILY
Qty: 90 TABLET | Refills: 1 | OUTPATIENT
Start: 2025-01-21

## 2025-01-21 RX ORDER — SERTRALINE HYDROCHLORIDE 25 MG/1
25 TABLET, FILM COATED ORAL DAILY
Qty: 90 TABLET | Refills: 1 | Status: CANCELLED | OUTPATIENT
Start: 2025-01-21

## 2025-03-09 DIAGNOSIS — F32.A ANXIETY AND DEPRESSION: ICD-10-CM

## 2025-03-09 DIAGNOSIS — F41.9 ANXIETY AND DEPRESSION: ICD-10-CM

## 2025-03-09 DIAGNOSIS — K21.9 GASTROESOPHAGEAL REFLUX DISEASE WITHOUT ESOPHAGITIS: ICD-10-CM

## 2025-03-10 RX ORDER — SERTRALINE HYDROCHLORIDE 25 MG/1
25 TABLET, FILM COATED ORAL DAILY
Qty: 90 TABLET | Refills: 1 | OUTPATIENT
Start: 2025-03-10

## 2025-03-10 RX ORDER — FAMOTIDINE 40 MG/1
40 TABLET, FILM COATED ORAL NIGHTLY
Qty: 90 TABLET | Refills: 3 | OUTPATIENT
Start: 2025-03-10

## 2025-04-24 ENCOUNTER — APPOINTMENT (OUTPATIENT)
Facility: HOSPITAL | Age: 45
End: 2025-04-24
Payer: MEDICAID

## 2025-04-24 ENCOUNTER — HOSPITAL ENCOUNTER (EMERGENCY)
Facility: HOSPITAL | Age: 45
Discharge: HOME | End: 2025-04-24
Attending: EMERGENCY MEDICINE
Payer: MEDICAID

## 2025-04-24 VITALS
DIASTOLIC BLOOD PRESSURE: 81 MMHG | BODY MASS INDEX: 31.65 KG/M2 | TEMPERATURE: 98.4 F | RESPIRATION RATE: 16 BRPM | WEIGHT: 190 LBS | HEIGHT: 65 IN | SYSTOLIC BLOOD PRESSURE: 128 MMHG | HEART RATE: 89 BPM | OXYGEN SATURATION: 98 %

## 2025-04-24 DIAGNOSIS — R19.7 NAUSEA, VOMITING, AND DIARRHEA: Primary | ICD-10-CM

## 2025-04-24 DIAGNOSIS — R11.2 NAUSEA, VOMITING, AND DIARRHEA: Primary | ICD-10-CM

## 2025-04-24 LAB
ALBUMIN SERPL-MCNC: 4.4 G/DL (ref 3.5–5)
ALBUMIN/GLOB SERPL: 1.1 (ref 1.1–2.2)
ALP SERPL-CCNC: 86 U/L (ref 45–117)
ALT SERPL-CCNC: 45 U/L (ref 12–78)
ANION GAP SERPL CALC-SCNC: 5 MMOL/L (ref 2–12)
APPEARANCE UR: CLEAR
AST SERPL-CCNC: 29 U/L (ref 15–37)
BACTERIA URNS QL MICRO: NEGATIVE /HPF
BASOPHILS # BLD: 0.05 K/UL (ref 0–0.1)
BASOPHILS NFR BLD: 0.2 % (ref 0–1)
BILIRUB SERPL-MCNC: 1 MG/DL (ref 0.2–1)
BILIRUB UR QL: NEGATIVE
BUN SERPL-MCNC: 19 MG/DL (ref 6–20)
BUN/CREAT SERPL: 21 (ref 12–20)
CALCIUM SERPL-MCNC: 9.4 MG/DL (ref 8.5–10.1)
CHLORIDE SERPL-SCNC: 107 MMOL/L (ref 97–108)
CO2 SERPL-SCNC: 23 MMOL/L (ref 21–32)
COLOR UR: NORMAL
COMMENT:: NORMAL
CREAT SERPL-MCNC: 0.91 MG/DL (ref 0.7–1.3)
DIFFERENTIAL METHOD BLD: ABNORMAL
EOSINOPHIL # BLD: 0.07 K/UL (ref 0–0.4)
EOSINOPHIL NFR BLD: 0.3 % (ref 0–7)
EPITH CASTS URNS QL MICRO: NORMAL /LPF
ERYTHROCYTE [DISTWIDTH] IN BLOOD BY AUTOMATED COUNT: 12.1 % (ref 11.5–14.5)
GLOBULIN SER CALC-MCNC: 4 G/DL (ref 2–4)
GLUCOSE SERPL-MCNC: 117 MG/DL (ref 65–100)
GLUCOSE UR STRIP.AUTO-MCNC: NEGATIVE MG/DL
HCT VFR BLD AUTO: 46.6 % (ref 36.6–50.3)
HGB BLD-MCNC: 16.2 G/DL (ref 12.1–17)
HGB UR QL STRIP: NEGATIVE
HYALINE CASTS URNS QL MICRO: NORMAL /LPF (ref 0–5)
IMM GRANULOCYTES # BLD AUTO: 0.14 K/UL (ref 0–0.04)
IMM GRANULOCYTES NFR BLD AUTO: 0.6 % (ref 0–0.5)
KETONES UR QL STRIP.AUTO: NEGATIVE MG/DL
LEUKOCYTE ESTERASE UR QL STRIP.AUTO: NEGATIVE
LIPASE SERPL-CCNC: 31 U/L (ref 13–75)
LYMPHOCYTES # BLD: 1.75 K/UL (ref 0.8–3.5)
LYMPHOCYTES NFR BLD: 8.1 % (ref 12–49)
MCH RBC QN AUTO: 30.3 PG (ref 26–34)
MCHC RBC AUTO-ENTMCNC: 34.8 G/DL (ref 30–36.5)
MCV RBC AUTO: 87.3 FL (ref 80–99)
MONOCYTES # BLD: 1.77 K/UL (ref 0–1)
MONOCYTES NFR BLD: 8.1 % (ref 5–13)
NEUTS SEG # BLD: 17.95 K/UL (ref 1.8–8)
NEUTS SEG NFR BLD: 82.7 % (ref 32–75)
NITRITE UR QL STRIP.AUTO: NEGATIVE
NRBC # BLD: 0 K/UL (ref 0–0.01)
NRBC BLD-RTO: 0 PER 100 WBC
PH UR STRIP: 5 (ref 5–8)
PLATELET # BLD AUTO: 289 K/UL (ref 150–400)
PMV BLD AUTO: 10.9 FL (ref 8.9–12.9)
POTASSIUM SERPL-SCNC: 3.6 MMOL/L (ref 3.5–5.1)
PROT SERPL-MCNC: 8.4 G/DL (ref 6.4–8.2)
PROT UR STRIP-MCNC: NEGATIVE MG/DL
RBC # BLD AUTO: 5.34 M/UL (ref 4.1–5.7)
RBC #/AREA URNS HPF: NORMAL /HPF (ref 0–5)
SODIUM SERPL-SCNC: 135 MMOL/L (ref 136–145)
SP GR UR REFRACTOMETRY: >1.03
SPECIMEN HOLD: NORMAL
SPECIMEN HOLD: NORMAL
UROBILINOGEN UR QL STRIP.AUTO: 0.2 EU/DL (ref 0.2–1)
WBC # BLD AUTO: 21.7 K/UL (ref 4.1–11.1)
WBC URNS QL MICRO: NORMAL /HPF (ref 0–4)

## 2025-04-24 PROCEDURE — 96374 THER/PROPH/DIAG INJ IV PUSH: CPT

## 2025-04-24 PROCEDURE — 99285 EMERGENCY DEPT VISIT HI MDM: CPT

## 2025-04-24 PROCEDURE — 2580000003 HC RX 258

## 2025-04-24 PROCEDURE — 85025 COMPLETE CBC W/AUTO DIFF WBC: CPT

## 2025-04-24 PROCEDURE — 83690 ASSAY OF LIPASE: CPT

## 2025-04-24 PROCEDURE — 81001 URINALYSIS AUTO W/SCOPE: CPT

## 2025-04-24 PROCEDURE — 96361 HYDRATE IV INFUSION ADD-ON: CPT

## 2025-04-24 PROCEDURE — 80053 COMPREHEN METABOLIC PANEL: CPT

## 2025-04-24 PROCEDURE — 74177 CT ABD & PELVIS W/CONTRAST: CPT

## 2025-04-24 PROCEDURE — 6360000002 HC RX W HCPCS

## 2025-04-24 PROCEDURE — 6360000004 HC RX CONTRAST MEDICATION: Performed by: EMERGENCY MEDICINE

## 2025-04-24 PROCEDURE — 6370000000 HC RX 637 (ALT 250 FOR IP)

## 2025-04-24 RX ORDER — KETOROLAC TROMETHAMINE 30 MG/ML
15 INJECTION, SOLUTION INTRAMUSCULAR; INTRAVENOUS
Status: COMPLETED | OUTPATIENT
Start: 2025-04-24 | End: 2025-04-24

## 2025-04-24 RX ORDER — ONDANSETRON 4 MG/1
4 TABLET, ORALLY DISINTEGRATING ORAL 3 TIMES DAILY PRN
Qty: 21 TABLET | Refills: 0 | Status: SHIPPED | OUTPATIENT
Start: 2025-04-24 | End: 2025-05-01

## 2025-04-24 RX ORDER — DICYCLOMINE HCL 20 MG
20 TABLET ORAL 4 TIMES DAILY
Qty: 20 TABLET | Refills: 0 | Status: SHIPPED | OUTPATIENT
Start: 2025-04-24 | End: 2025-04-29

## 2025-04-24 RX ORDER — IOPAMIDOL 755 MG/ML
100 INJECTION, SOLUTION INTRAVASCULAR
Status: COMPLETED | OUTPATIENT
Start: 2025-04-24 | End: 2025-04-24

## 2025-04-24 RX ORDER — 0.9 % SODIUM CHLORIDE 0.9 %
1000 INTRAVENOUS SOLUTION INTRAVENOUS ONCE
Status: COMPLETED | OUTPATIENT
Start: 2025-04-24 | End: 2025-04-24

## 2025-04-24 RX ORDER — DICYCLOMINE HYDROCHLORIDE 10 MG/1
10 CAPSULE ORAL
Status: COMPLETED | OUTPATIENT
Start: 2025-04-24 | End: 2025-04-24

## 2025-04-24 RX ADMIN — DICYCLOMINE HYDROCHLORIDE 10 MG: 10 CAPSULE ORAL at 21:32

## 2025-04-24 RX ADMIN — IOPAMIDOL 100 ML: 755 INJECTION, SOLUTION INTRAVENOUS at 21:07

## 2025-04-24 RX ADMIN — SODIUM CHLORIDE 1000 ML: 0.9 INJECTION, SOLUTION INTRAVENOUS at 20:57

## 2025-04-24 RX ADMIN — KETOROLAC TROMETHAMINE 15 MG: 30 INJECTION, SOLUTION INTRAMUSCULAR; INTRAVENOUS at 21:32

## 2025-04-24 ASSESSMENT — LIFESTYLE VARIABLES
HOW OFTEN DO YOU HAVE A DRINK CONTAINING ALCOHOL: NEVER
HOW MANY STANDARD DRINKS CONTAINING ALCOHOL DO YOU HAVE ON A TYPICAL DAY: PATIENT DOES NOT DRINK

## 2025-04-24 ASSESSMENT — PAIN - FUNCTIONAL ASSESSMENT: PAIN_FUNCTIONAL_ASSESSMENT: NONE - DENIES PAIN

## 2025-04-25 NOTE — ED TRIAGE NOTES
Pt arrives from home via EMS for CC nausea/vomiting that started around 5:30pm. He states he's had 6 episodes of vomiting and some diarrhea. He denies any pain at this time. EMS gave 4mg Zofran ODT just PTA.    BS 84

## 2025-04-25 NOTE — ED PROVIDER NOTES
[facility] EMERGENCY DEPARTMENT  EMERGENCY DEPARTMENT ENCOUNTER      Pt Name: Fernandez Blanc  MRN: [id number]  Birthdate 1980  Date of evaluation: 4/24/2025  Provider: Julio César Velazquez PA-C    CHIEF COMPLAINT       Chief Complaint   Patient presents with    Nausea    Vomiting         HISTORY OF PRESENT ILLNESS   (Location/Symptom, Timing/Onset, Context/Setting, Quality, Duration, Modifying Factors, Severity)  Note limiting factors.   44-year-old male with no significant past medical history presents with complaint of abdominal pain, nausea, vomiting, and diarrhea.  Patient reports symptoms started this evening around 5:30 PM.  Reports 6 episodes of vomiting since this started and 1 episode of loose stool.  No blood in the stool.  No recent antibiotics.  No recent travel.  Denies any sick contacts.  Denies fever, upper respiratory symptoms, chest pain, shortness of breath, dysuria, testicular pain/swelling.  Denies drugs or alcohol use.    The history is provided by the patient.         Review of External Medical Records:     Nursing Notes were reviewed.    REVIEW OF SYSTEMS    (2-9 systems for level 4, 10 or more for level 5)     Review of Systems    Except as noted above the remainder of the review of systems was reviewed and negative.       PAST MEDICAL HISTORY   No past medical history on file.      SURGICAL HISTORY     No past surgical history on file.      CURRENT MEDICATIONS       Previous Medications    FAMOTIDINE (PEPCID) 40 MG TABLET    TAKE 1 TABLET BY MOUTH AT BEDTIME    HYDROCORTISONE (ANUSOL-HC) 2.5 % CREA RECTAL CREAM    Apply topically after bowel movement    SERTRALINE (ZOLOFT) 25 MG TABLET    Take 1 tablet by mouth daily       ALLERGIES     Patient has no known allergies.    FAMILY HISTORY       Family History   Problem Relation Age of Onset    No Known Problems Father     No Known Problems Mother           SOCIAL HISTORY       Social History     Socioeconomic History    Marital status:

## 2025-06-10 DIAGNOSIS — K21.9 GASTROESOPHAGEAL REFLUX DISEASE WITHOUT ESOPHAGITIS: ICD-10-CM

## 2025-06-10 RX ORDER — FAMOTIDINE 40 MG/1
40 TABLET, FILM COATED ORAL NIGHTLY
Qty: 90 TABLET | Refills: 1 | Status: SHIPPED | OUTPATIENT
Start: 2025-06-10

## 2025-07-07 DIAGNOSIS — F32.A ANXIETY AND DEPRESSION: ICD-10-CM

## 2025-07-07 DIAGNOSIS — F41.9 ANXIETY AND DEPRESSION: ICD-10-CM

## 2025-07-07 RX ORDER — SERTRALINE HYDROCHLORIDE 25 MG/1
25 TABLET, FILM COATED ORAL DAILY
Qty: 90 TABLET | Refills: 1 | Status: SHIPPED | OUTPATIENT
Start: 2025-07-07

## 2025-07-10 ENCOUNTER — OFFICE VISIT (OUTPATIENT)
Dept: PRIMARY CARE CLINIC | Facility: CLINIC | Age: 45
End: 2025-07-10
Payer: MEDICAID

## 2025-07-10 VITALS
HEART RATE: 62 BPM | HEIGHT: 65 IN | WEIGHT: 194.6 LBS | RESPIRATION RATE: 16 BRPM | TEMPERATURE: 97.5 F | BODY MASS INDEX: 32.42 KG/M2 | OXYGEN SATURATION: 98 % | DIASTOLIC BLOOD PRESSURE: 82 MMHG | SYSTOLIC BLOOD PRESSURE: 118 MMHG

## 2025-07-10 DIAGNOSIS — F32.A ANXIETY AND DEPRESSION: Primary | ICD-10-CM

## 2025-07-10 DIAGNOSIS — F41.9 ANXIETY AND DEPRESSION: Primary | ICD-10-CM

## 2025-07-10 PROCEDURE — 99213 OFFICE O/P EST LOW 20 MIN: CPT

## 2025-07-10 ASSESSMENT — ENCOUNTER SYMPTOMS
WHEEZING: 0
BLOOD IN STOOL: 0
COUGH: 0
ABDOMINAL PAIN: 0
CONSTIPATION: 0
VOMITING: 0
DIARRHEA: 0
NAUSEA: 0
SHORTNESS OF BREATH: 0

## 2025-07-10 ASSESSMENT — PATIENT HEALTH QUESTIONNAIRE - PHQ9
SUM OF ALL RESPONSES TO PHQ QUESTIONS 1-9: 8
2. FEELING DOWN, DEPRESSED OR HOPELESS: SEVERAL DAYS
SUM OF ALL RESPONSES TO PHQ QUESTIONS 1-9: 8
6. FEELING BAD ABOUT YOURSELF - OR THAT YOU ARE A FAILURE OR HAVE LET YOURSELF OR YOUR FAMILY DOWN: SEVERAL DAYS
10. IF YOU CHECKED OFF ANY PROBLEMS, HOW DIFFICULT HAVE THESE PROBLEMS MADE IT FOR YOU TO DO YOUR WORK, TAKE CARE OF THINGS AT HOME, OR GET ALONG WITH OTHER PEOPLE: VERY DIFFICULT
1. LITTLE INTEREST OR PLEASURE IN DOING THINGS: SEVERAL DAYS
SUM OF ALL RESPONSES TO PHQ QUESTIONS 1-9: 8
4. FEELING TIRED OR HAVING LITTLE ENERGY: SEVERAL DAYS
SUM OF ALL RESPONSES TO PHQ QUESTIONS 1-9: 8
3. TROUBLE FALLING OR STAYING ASLEEP: NOT AT ALL
5. POOR APPETITE OR OVEREATING: MORE THAN HALF THE DAYS
9. THOUGHTS THAT YOU WOULD BE BETTER OFF DEAD, OR OF HURTING YOURSELF: NOT AT ALL
7. TROUBLE CONCENTRATING ON THINGS, SUCH AS READING THE NEWSPAPER OR WATCHING TELEVISION: MORE THAN HALF THE DAYS

## 2025-07-10 NOTE — PROGRESS NOTES
Have you been to the ER, urgent care clinic since your last visit?  Hospitalized since your last visit?   04/24/25 In Chart    Have you seen or consulted any other health care providers outside our system since your last visit?   no           
0.07  0.00 - 0.40 K/UL Final    Basophils Absolute 04/24/2025 0.05  0.00 - 0.10 K/UL Final    Immature Granulocytes Absolute 04/24/2025 0.14 (H)  0.00 - 0.04 K/UL Final    Differential Type 04/24/2025 AUTOMATED    Final    Sodium 04/24/2025 135 (L)  136 - 145 mmol/L Final    Potassium 04/24/2025 3.6  3.5 - 5.1 mmol/L Final    Chloride 04/24/2025 107  97 - 108 mmol/L Final    CO2 04/24/2025 23  21 - 32 mmol/L Final    Anion Gap 04/24/2025 5  2 - 12 mmol/L Final    PLEASE NOTE NEW REFERENCE RANGE    Glucose 04/24/2025 117 (H)  65 - 100 mg/dL Final    BUN 04/24/2025 19  6 - 20 MG/DL Final    Creatinine 04/24/2025 0.91  0.70 - 1.30 MG/DL Final    BUN/Creatinine Ratio 04/24/2025 21 (H)  12 - 20   Final    Est, Glom Filt Rate 04/24/2025 >90  >60 ml/min/1.73m2 Final    Comment:    Pediatric calculator link: https://www.kidney.org/professionals/kdoqi/gfr_calculatorped     These results are not intended for use in patients <18 years of age.     eGFR results are calculated without a race factor using  the 2021 CKD-EPI equation. Careful clinical correlation is recommended, particularly when comparing to results calculated using previous equations.  The CKD-EPI equation is less accurate in patients with extremes of muscle mass, extra-renal metabolism of creatinine, excessive creatine ingestion, or following therapy that affects renal tubular secretion.      Calcium 04/24/2025 9.4  8.5 - 10.1 MG/DL Final    Total Bilirubin 04/24/2025 1.0  0.2 - 1.0 MG/DL Final    ALT 04/24/2025 45  12 - 78 U/L Final    AST 04/24/2025 29  15 - 37 U/L Final    Alk Phosphatase 04/24/2025 86  45 - 117 U/L Final    Total Protein 04/24/2025 8.4 (H)  6.4 - 8.2 g/dL Final    Albumin 04/24/2025 4.4  3.5 - 5.0 g/dL Final    Globulin 04/24/2025 4.0  2.0 - 4.0 g/dL Final    Albumin/Globulin Ratio 04/24/2025 1.1  1.1 - 2.2   Final    Lipase 04/24/2025 31  13 - 75 U/L Final    PLEASE NOTE NEW REFERENCE RANGE    Color, UA 04/24/2025 YELLOW/STRAW    Final

## 2025-07-16 DIAGNOSIS — K21.9 GASTROESOPHAGEAL REFLUX DISEASE WITHOUT ESOPHAGITIS: ICD-10-CM

## 2025-07-16 RX ORDER — FAMOTIDINE 40 MG/1
40 TABLET, FILM COATED ORAL NIGHTLY
Qty: 90 TABLET | Refills: 1 | OUTPATIENT
Start: 2025-07-16